# Patient Record
Sex: MALE | Race: ASIAN | NOT HISPANIC OR LATINO | URBAN - METROPOLITAN AREA
[De-identification: names, ages, dates, MRNs, and addresses within clinical notes are randomized per-mention and may not be internally consistent; named-entity substitution may affect disease eponyms.]

---

## 2022-08-17 ENCOUNTER — EMERGENCY (EMERGENCY)
Facility: HOSPITAL | Age: 82
LOS: 1 days | Discharge: HOME | End: 2022-08-17
Attending: EMERGENCY MEDICINE | Admitting: EMERGENCY MEDICINE
Payer: COMMERCIAL

## 2022-08-17 VITALS
RESPIRATION RATE: 18 BRPM | HEART RATE: 88 BPM | TEMPERATURE: 98 F | OXYGEN SATURATION: 98 % | DIASTOLIC BLOOD PRESSURE: 74 MMHG | SYSTOLIC BLOOD PRESSURE: 164 MMHG

## 2022-08-17 VITALS
OXYGEN SATURATION: 99 % | WEIGHT: 199.08 LBS | DIASTOLIC BLOOD PRESSURE: 99 MMHG | HEIGHT: 66 IN | SYSTOLIC BLOOD PRESSURE: 179 MMHG | RESPIRATION RATE: 19 BRPM | HEART RATE: 98 BPM | TEMPERATURE: 98 F

## 2022-08-17 LAB
APPEARANCE UR: CLEAR — SIGNIFICANT CHANGE UP
BILIRUB UR-MCNC: NEGATIVE — SIGNIFICANT CHANGE UP
COLOR SPEC: SIGNIFICANT CHANGE UP
DIFF PNL FLD: ABNORMAL
GLUCOSE UR QL: NEGATIVE — SIGNIFICANT CHANGE UP
KETONES UR-MCNC: NEGATIVE — SIGNIFICANT CHANGE UP
LEUKOCYTE ESTERASE UR-ACNC: NEGATIVE — SIGNIFICANT CHANGE UP
NITRITE UR-MCNC: NEGATIVE — SIGNIFICANT CHANGE UP
PH UR: 6 — SIGNIFICANT CHANGE UP (ref 5–8)
PROT UR-MCNC: NEGATIVE — SIGNIFICANT CHANGE UP
SP GR SPEC: 1.01 — SIGNIFICANT CHANGE UP (ref 1.01–1.02)
UROBILINOGEN FLD QL: NEGATIVE — SIGNIFICANT CHANGE UP

## 2022-08-17 PROCEDURE — 81001 URINALYSIS AUTO W/SCOPE: CPT

## 2022-08-17 PROCEDURE — 99283 EMERGENCY DEPT VISIT LOW MDM: CPT | Mod: 25

## 2022-08-17 PROCEDURE — 99284 EMERGENCY DEPT VISIT MOD MDM: CPT

## 2022-08-17 PROCEDURE — 51702 INSERT TEMP BLADDER CATH: CPT

## 2022-08-17 PROCEDURE — 87086 URINE CULTURE/COLONY COUNT: CPT

## 2022-08-17 NOTE — ED PROVIDER NOTE - NSFOLLOWUPINSTRUCTIONS_ED_ALL_ED_FT
-- Follow up with urology referral to have wheeler catheter removed.    -- Return to the ER for worsening or persistent symptoms, and/or ANY NEW OR CONCERNING SYMPTOMS.    -- If you have difficulty following up, please call: [phone] to obtain a [facility] doctor or specialist who takes your insurance in your area.      Urinary Retention in Men    WHAT YOU NEED TO KNOW:    Urinary retention is a condition that develops when your bladder does not empty completely when you urinate.   Male Reproductive System         DISCHARGE INSTRUCTIONS:    Medicines:   •Medicines can help decrease the size of your prostate, fight infection, and help you urinate more easily.      •Take your medicine as directed. Contact your healthcare provider if you think your medicine is not helping or if you have side effects. Tell him or her if you are allergic to any medicine. Keep a list of the medicines, vitamins, and herbs you take. Include the amounts, and when and why you take them. Bring the list or the pill bottles to follow-up visits. Carry your medicine list with you in case of an emergency.      Wheeler catheter care: You may need a Wheeler catheter for up to 2 weeks at home. Healthcare providers will give you a smaller leg bag to collect urine. Keep the bag below your waist. This will prevent urine from flowing back into your bladder and causing an infection or other problems. Also, keep the tube free of kinks so the urine will drain properly. Do not pull on the catheter. This can cause pain and bleeding, and may cause the catheter to come out. Ask your healthcare provider or urologist for more information on Wheeler catheter care.    Urinate regularly: When your catheter is removed, do not let your bladder become too full before you urinate. Set regular times each day to urinate. Urinate as soon as you feel the need or at least every 3 hours while you are awake. Do not drink liquids before you go to bed. Urinate right before you go to bed.    Follow up with your healthcare provider or urologist as directed: Write down your questions so you remember to ask them during your visits.     Contact your healthcare provider or urologist if:   •You have a fever.      •You have pain when you urinate.      •You have blood in your urine.      •You have problems with your catheter.      •You have questions or concerns about your condition or care.      Return to the emergency department if:   •You have severe abdominal pain.      •You are breathing faster than usual.      •Your heartbeat is faster than usual.      •Your face, hands, feet, or ankles are swollen.

## 2022-08-17 NOTE — ED ADULT NURSE NOTE - OBJECTIVE STATEMENT
Patient is a 81yo male complaining of urinary retention Patient states the last time he urinated was last night. Patient had 624ml in bladder (bladder scanner). Patient denies BPH or urinary difficultly in the past. Patient has pest medical history of HTN and water retention.

## 2022-08-17 NOTE — ED PROVIDER NOTE - CLINICAL SUMMARY MEDICAL DECISION MAKING FREE TEXT BOX
Patient found to be in urinary retention with bladder scan showing over 600 cc of urine.  Ojeda placed draining clear urine.  Will DC with leg bag and follow-up with urology.

## 2022-08-17 NOTE — ED ADULT NURSE NOTE - NSIMPLEMENTINTERV_GEN_ALL_ED
Implemented All Universal Safety Interventions:  Benoit to call system. Call bell, personal items and telephone within reach. Instruct patient to call for assistance. Room bathroom lighting operational. Non-slip footwear when patient is off stretcher. Physically safe environment: no spills, clutter or unnecessary equipment. Stretcher in lowest position, wheels locked, appropriate side rails in place.

## 2022-08-17 NOTE — ED PROVIDER NOTE - PHYSICAL EXAMINATION
Gen: alert, NAD  HEENT:  NC/AT, PERR, no exophthalmos  CV:  well perfused, rrr   Pulm:  normal RR, I/E ratio and chest excursion  Abd: s/nt/nd  MSK: moving all extremities  Neuro:  non-focal  Skin:  visualized areas intact  Psych: AOx3

## 2022-08-17 NOTE — ED PROVIDER NOTE - PATIENT PORTAL LINK FT
You can access the FollowMyHealth Patient Portal offered by [facility] by registering at the following website: [email or web address]. By joining [employer]’s FollowMyHealth portal, you will also be able to view your health information using other applications (apps) compatible with our system.

## 2022-08-17 NOTE — ED PROVIDER NOTE - OBJECTIVE STATEMENT
Patient states that he was not able to urinate this morning.  Last time he urinated was last night before going to bed.  Patient states he attempted to urinate has urge to urinate but he cannot start a stream.  Patient does not denies any recent dysuria.  Denies any history of prior urinary obstruction.

## 2022-08-18 LAB
CULTURE RESULTS: NO GROWTH — SIGNIFICANT CHANGE UP
SPECIMEN SOURCE: SIGNIFICANT CHANGE UP

## 2022-08-21 ENCOUNTER — EMERGENCY (EMERGENCY)
Facility: HOSPITAL | Age: 82
LOS: 1 days | Discharge: HOME | End: 2022-08-21
Attending: EMERGENCY MEDICINE | Admitting: EMERGENCY MEDICINE
Payer: COMMERCIAL

## 2022-08-21 VITALS
HEART RATE: 86 BPM | SYSTOLIC BLOOD PRESSURE: 140 MMHG | DIASTOLIC BLOOD PRESSURE: 80 MMHG | WEIGHT: 199.08 LBS | OXYGEN SATURATION: 98 % | TEMPERATURE: 98 F | RESPIRATION RATE: 18 BRPM | HEIGHT: 66 IN

## 2022-08-21 PROCEDURE — 87086 URINE CULTURE/COLONY COUNT: CPT

## 2022-08-21 PROCEDURE — 99283 EMERGENCY DEPT VISIT LOW MDM: CPT

## 2022-08-21 PROCEDURE — 99284 EMERGENCY DEPT VISIT MOD MDM: CPT

## 2022-08-21 NOTE — ED PROVIDER NOTE - CLINICAL SUMMARY MEDICAL DECISION MAKING FREE TEXT BOX
Patient is an 82-year-old male who presents to the emergency room with a chief complaint of clogged Ojeda catheter.  Past medical history of hypertension peripheral edema.  Patient was last seen in the emergency room on August 17.  At that time he was not able to urinate in the morning.  Patient was found to be in acute urinary retention bladder scan showing over 600 cc of urine.  Ojeda was placed UA urine culture was performed urine culture is reviewed at this time with no growth.  Patient clinically improved and was discharged home.  Patient reports that he was doing well and the Ojeda had been draining fine without issues until around 2 PM today when he noticed decreased urine output.  He then began to develop some suprapubic pressure and came to the emergency room for further work-up.  Attempted to flush Ojeda at bedside unsuccessfully.  Lying in bed no acute distress normocephalic atraumatic pupils equal round heart is regular rate lungs are clear to auscultation abdomen soft nontender nondistended On exam patient Ojeda noted to be in place no urine output at this time there appears to be a clot noted.  Ojeda was replaced still not draining well manually irrigated at the bedside several times now draining pink-tinged urine patient provided with water.  Will hydrate and monitor send UA urine culture.  Patient confirms that he is on aspirin but denies use of other blood thinners.  Has follow-up with urology on the first

## 2022-08-21 NOTE — ED PROVIDER NOTE - NS ED ATTENDING STATEMENT MOD
This was a shared visit with the ANKUR. I reviewed and verified the documentation and independently performed the documented:

## 2022-08-21 NOTE — ED PROVIDER NOTE - OBJECTIVE STATEMENT
pt is a 83yo male with pmhx of htn presents with urinary catheter issue. pt reports he had catheter placed on 8/17 due to urinary retention. at this time pt had urine cx which was negative for infection. pt reports today catheter was not draining well so he came to ed for eval. pt has urologist apt 9/1/22. pt denies fever.

## 2022-08-21 NOTE — ED PROVIDER NOTE - CARE PROVIDER_API CALL
Davonte Siegel)  Urology  [address], [address]  [place] [address]  Phone: [phone]  Fax: [phone]  Follow Up Time: 4-6 Days

## 2022-08-21 NOTE — ED PROVIDER NOTE - NSFOLLOWUPINSTRUCTIONS_ED_ALL_ED_FT
[facility]                                                                         Indwelling Urinary Catheter Care, Adult      An indwelling urinary catheter is a thin tube that is put into your bladder. The tube helps to drain pee (urine) out of your body. The tube goes in through your urethra. Your urethra is where pee comes out of your body. Your pee will come out through the catheter, then it will go into a bag (drainage bag).    Take good care of your catheter so it will work well.      How to wear your catheter and bag    Supplies needed     •Sticky tape (adhesive tape) or a leg strap.      •Alcohol wipe or soap and water (if you use tape).      •A clean towel (if you use tape).      •Large overnight bag.      •Smaller bag (leg bag).      Wearing your catheter     Attach your catheter to your leg with tape or a leg strap.  •Make sure the catheter is not pulled tight.      •If a leg strap gets wet, take it off and put on a dry strap.    •If you use tape to hold the bag on your le.Use an alcohol wipe or soap and water to wash your skin where the tape made it sticky before.      2.Use a clean towel to pat-dry that skin.      3.Use new tape to make the bag stay on your leg.        Wearing your bags    You should have been given a large overnight bag.  •You may wear the overnight bag in the day or night.      •Always have the overnight bag lower than your bladder.  Do not let the bag touch the floor.      •Before you go to sleep, put a clean plastic bag in a wastebasket. Then hang the overnight bag inside the wastebasket.      You should also have a smaller leg bag that fits under your clothes.  •Always wear the leg bag below your knee.      •Do not wear your leg bag at night.        How to care for your skin and catheter    Supplies needed     •A clean washcloth.      •Water and mild soap.      •A clean towel.        Caring for your skin and catheter       Female anatomy showing the bladder, labia, and urethra and an indwelling urinary catheter in the bladder.       Male anatomy showing the bladder, penis, and urethra and an indwelling urinary catheter in the bladder.   •Clean the skin around your catheter every day:  1.Wash your hands with soap and water.      2.Wet a clean washcloth in warm water and mild soap.    3.Clean the skin around your urethra.•If you are female:  •Gently spread the folds of skin around your vagina (labia).      •With the washcloth in your other hand, wipe the inner side of your labia on each side. Wipe from front to back.      •If you are male:  •Pull back any skin that covers the end of your penis (foreskin).      •With the washcloth in your other hand, wipe your penis in small circles. Start wiping at the tip of your penis, then move away from the catheter.      •Move the foreskin back in place, if needed.        4.With your free hand, hold the catheter close to where it goes into your body.  •Keep holding the catheter during cleaning so it does not get pulled out.      5.With the washcloth in your other hand, clean the catheter.  •Only wipe downward on the catheter, toward the drainage bag.      •Do not wipe upward toward your body. Doing this may push germs into your urethra and cause infection.        6.Use a clean towel to pat-dry the catheter and the skin around it. Make sure to wipe off all soap.      7.Wash your hands with soap and water.        •Shower every day. Do not take baths.      • Do not use cream, ointment, or lotion on the area where the catheter goes into your body, unless your doctor tells you to.      • Do not use powders, sprays, or lotions on your genital area.    •Check your skin around the catheter every day for signs of infection. Check for:  •Redness, swelling, or pain.      •Fluid or blood.      •Warmth.      •Pus or a bad smell.          How to empty the bag    Supplies needed     •Rubbing alcohol.      •Gauze pad or cotton ball.      •Tape or a leg strap.      Emptying the bag     Pour the pee out of your bag when it is ?–½ full, or at least 2–3 times a day. Do this for your overnight bag and your leg bag.  1.Wash your hands with soap and water.      2.Separate (detach) the bag from your leg.      3.Hold the bag over the toilet or a clean pail. Keep the bag lower than your hips and bladder. This is so the pee (urine) does not go back into the tube.      4.Open the pour spout. It is at the bottom of the bag.      5.Empty the pee into the toilet or pail. Do not let the pour spout touch any surface.      6.Put rubbing alcohol on a gauze pad or cotton ball.      7.Use the gauze pad or cotton ball to clean the pour spout.      8.Close the pour spout.      9.Attach the bag to your leg with tape or a leg strap.      10.Wash your hands with soap and water.      Follow instructions for cleaning the drainage bag:  •From the product maker.      •As told by your doctor.        How to change the bag    Changing the bag     Replace your bag when it starts to leak, smell bad, or look dirty.  1.Wash your hands with soap and water.      2.Separate the dirty bag from your leg.      3.Pinch the catheter with your fingers so that pee does not spill out.      4.Separate the catheter tube from the bag tube where these tubes connect (at the connection valve). Do not let the tubes touch any surface.      5.Clean the end of the catheter tube with an alcohol wipe. Use a different alcohol wipe to clean the end of the bag tube.      6.Connect the catheter tube to the tube of the clean bag.      7.Attach the clean bag to your leg with tape or a leg strap. Do not make the bag tight on your leg.      8.Wash your hands with soap and water.        General rules  Washing hands with soap and water.   • Never pull on your catheter. Never try to take it out. Doing that can hurt you.      •Always wash your hands before and after you touch your catheter or bag. Use a mild, fragrance-free soap. If you do not have soap and water, use hand .      •Always make sure there are no twists or bends (kinks) in the catheter tube.      •Always make sure there are no leaks in the catheter or bag.      •Drink enough fluid to keep your pee pale yellow.      • Do not take baths, swim, or use a hot tub.      •If you are female, wipe from front to back after you poop (have a bowel movement).        Contact a doctor if:    •Your pee is cloudy.      •Your pee smells worse than usual.      •Your catheter gets clogged.      •Your catheter leaks.      •Your bladder feels full.        Get help right away if:    •You have redness, swelling, or pain where the catheter goes into your body.      •You have fluid, blood, pus, or a bad smell coming from the area where the catheter goes into your body.      •Your skin feels warm where the catheter goes into your body.      •You have a fever.    •You have pain in your:  •Belly (abdomen).      •Legs.      •Lower back.      •Bladder.        •You see blood in the catheter.      •Your pee is pink or red.      •You feel sick to your stomach (nauseous).      •You throw up (vomit).      •You have chills.      •Your pee is not draining into the bag.      •Your catheter gets pulled out.        Summary    •An indwelling urinary catheter is a thin tube that is placed into the bladder to help drain pee (urine) out of the body.       •The catheter is placed into the part of the body that drains pee from the bladder (urethra).      •Taking good care of your catheter will keep it working properly and help prevent problems.      •Always wash your hands before and after touching your catheter or bag.      • Never pull on your catheter or try to take it out.      This information is not intended to replace advice given to you by your health care provider. Make sure you discuss any questions you have with your health care provider.      Document Revised: 2022 Document Reviewed: 2021    Elsevier Patient Education ©  [employer] 1. FOLLOW UP WITH YOUR PRIMARY DOCTOR IN 24-48 HOURS.   2. FOLLOW UP WITH ALL SPECIALIST DISCUSSED DURING YOUR VISIT.   3. TAKE ALL MEDICATIONS PRESCRIBED IN THE ER IF ANY ARE PRESCRIBED. CONTINUE YOUR HOME MEDICATIONS UNLESS OTHERWISE ADVISED DIFFERENTLY.   4. RETURN FOR WORSENING SYMPTOMS OR CONCERNS INCLUDING BUT NOT LIMITED TO FEVER, CHEST PAIN, OR TROUBLE BREATHING OR ANY OTHER CONCERNS  hydrate with water                                                           Indwelling Urinary Catheter Care, Adult      An indwelling urinary catheter is a thin tube that is put into your bladder. The tube helps to drain pee (urine) out of your body. The tube goes in through your urethra. Your urethra is where pee comes out of your body. Your pee will come out through the catheter, then it will go into a bag (drainage bag).    Take good care of your catheter so it will work well.      How to wear your catheter and bag    Supplies needed     •Sticky tape (adhesive tape) or a leg strap.      •Alcohol wipe or soap and water (if you use tape).      •A clean towel (if you use tape).      •Large overnight bag.      •Smaller bag (leg bag).      Wearing your catheter     Attach your catheter to your leg with tape or a leg strap.  •Make sure the catheter is not pulled tight.      •If a leg strap gets wet, take it off and put on a dry strap.    •If you use tape to hold the bag on your le.Use an alcohol wipe or soap and water to wash your skin where the tape made it sticky before.      2.Use a clean towel to pat-dry that skin.      3.Use new tape to make the bag stay on your leg.        Wearing your bags    You should have been given a large overnight bag.  •You may wear the overnight bag in the day or night.      •Always have the overnight bag lower than your bladder.  Do not let the bag touch the floor.      •Before you go to sleep, put a clean plastic bag in a wastebasket. Then hang the overnight bag inside the wastebasket.      You should also have a smaller leg bag that fits under your clothes.  •Always wear the leg bag below your knee.      •Do not wear your leg bag at night.        How to care for your skin and catheter    Supplies needed     •A clean washcloth.      •Water and mild soap.      •A clean towel.        Caring for your skin and catheter       Female anatomy showing the bladder, labia, and urethra and an indwelling urinary catheter in the bladder.       Male anatomy showing the bladder, penis, and urethra and an indwelling urinary catheter in the bladder.   •Clean the skin around your catheter every day:  1.Wash your hands with soap and water.      2.Wet a clean washcloth in warm water and mild soap.    3.Clean the skin around your urethra.•If you are female:  •Gently spread the folds of skin around your vagina (labia).      •With the washcloth in your other hand, wipe the inner side of your labia on each side. Wipe from front to back.      •If you are male:  •Pull back any skin that covers the end of your penis (foreskin).      •With the washcloth in your other hand, wipe your penis in small circles. Start wiping at the tip of your penis, then move away from the catheter.      •Move the foreskin back in place, if needed.        4.With your free hand, hold the catheter close to where it goes into your body.  •Keep holding the catheter during cleaning so it does not get pulled out.      5.With the washcloth in your other hand, clean the catheter.  •Only wipe downward on the catheter, toward the drainage bag.      •Do not wipe upward toward your body. Doing this may push germs into your urethra and cause infection.        6.Use a clean towel to pat-dry the catheter and the skin around it. Make sure to wipe off all soap.      7.Wash your hands with soap and water.        •Shower every day. Do not take baths.      • Do not use cream, ointment, or lotion on the area where the catheter goes into your body, unless your doctor tells you to.      • Do not use powders, sprays, or lotions on your genital area.    •Check your skin around the catheter every day for signs of infection. Check for:  •Redness, swelling, or pain.      •Fluid or blood.      •Warmth.      •Pus or a bad smell.          How to empty the bag    Supplies needed     •Rubbing alcohol.      •Gauze pad or cotton ball.      •Tape or a leg strap.      Emptying the bag     Pour the pee out of your bag when it is ?–½ full, or at least 2–3 times a day. Do this for your overnight bag and your leg bag.  1.Wash your hands with soap and water.      2.Separate (detach) the bag from your leg.      3.Hold the bag over the toilet or a clean pail. Keep the bag lower than your hips and bladder. This is so the pee (urine) does not go back into the tube.      4.Open the pour spout. It is at the bottom of the bag.      5.Empty the pee into the toilet or pail. Do not let the pour spout touch any surface.      6.Put rubbing alcohol on a gauze pad or cotton ball.      7.Use the gauze pad or cotton ball to clean the pour spout.      8.Close the pour spout.      9.Attach the bag to your leg with tape or a leg strap.      10.Wash your hands with soap and water.      Follow instructions for cleaning the drainage bag:  •From the product maker.      •As told by your doctor.        How to change the bag    Changing the bag     Replace your bag when it starts to leak, smell bad, or look dirty.  1.Wash your hands with soap and water.      2.Separate the dirty bag from your leg.      3.Pinch the catheter with your fingers so that pee does not spill out.      4.Separate the catheter tube from the bag tube where these tubes connect (at the connection valve). Do not let the tubes touch any surface.      5.Clean the end of the catheter tube with an alcohol wipe. Use a different alcohol wipe to clean the end of the bag tube.      6.Connect the catheter tube to the tube of the clean bag.      7.Attach the clean bag to your leg with tape or a leg strap. Do not make the bag tight on your leg.      8.Wash your hands with soap and water.        General rules  Washing hands with soap and water.   • Never pull on your catheter. Never try to take it out. Doing that can hurt you.      •Always wash your hands before and after you touch your catheter or bag. Use a mild, fragrance-free soap. If you do not have soap and water, use hand .      •Always make sure there are no twists or bends (kinks) in the catheter tube.      •Always make sure there are no leaks in the catheter or bag.      •Drink enough fluid to keep your pee pale yellow.      • Do not take baths, swim, or use a hot tub.      •If you are female, wipe from front to back after you poop (have a bowel movement).        Contact a doctor if:    •Your pee is cloudy.      •Your pee smells worse than usual.      •Your catheter gets clogged.      •Your catheter leaks.      •Your bladder feels full.        Get help right away if:    •You have redness, swelling, or pain where the catheter goes into your body.      •You have fluid, blood, pus, or a bad smell coming from the area where the catheter goes into your body.      •Your skin feels warm where the catheter goes into your body.      •You have a fever.    •You have pain in your:  •Belly (abdomen).      •Legs.      •Lower back.      •Bladder.        •You see blood in the catheter.      •Your pee is pink or red.      •You feel sick to your stomach (nauseous).      •You throw up (vomit).      •You have chills.      •Your pee is not draining into the bag.      •Your catheter gets pulled out.        Summary    •An indwelling urinary catheter is a thin tube that is placed into the bladder to help drain pee (urine) out of the body.       •The catheter is placed into the part of the body that drains pee from the bladder (urethra).      •Taking good care of your catheter will keep it working properly and help prevent problems.      •Always wash your hands before and after touching your catheter or bag.      • Never pull on your catheter or try to take it out.      This information is not intended to replace advice given to you by your health care provider. Make sure you discuss any questions you have with your health care provider.      Document Revised: 2022 Document Reviewed: 2021    Elsevier Patient Education ©  [employer]

## 2022-08-21 NOTE — ED ADULT NURSE NOTE - OBJECTIVE STATEMENT
Patient arrives from triage alert and oriented c/o non draining Ojeda. Patient states that Ojeda was placed on the 17th and today at 2pm stopped flowing. Patient denies abdominal pain, leg Ojeda bag has blood tinged jamie urine. Ojeda irrigated to no avail. Ojeda changed with 16F Ojeda and irrigated by Dr. Palomino with some output. Currently monitoring urinary output.

## 2022-08-21 NOTE — ED PROVIDER NOTE - PATIENT PORTAL LINK FT
You can access the FollowMyHealth Patient Portal offered by Health system by registering at the following website: [email or web address]. By joining [employer]’s FollowMyHealth portal, you will also be able to view your health information using other applications (apps) compatible with our system.

## 2022-08-21 NOTE — ED PROVIDER NOTE - PROGRESS NOTE DETAILS
wheeler changed pt improved. draining clear urine advised hydration and urologist follow up. . All questions answered and concerns addressed. pt verbalized understanding and agreement with plan and dx. pt advised on next step and when/where to follow up. pt advised on all take home and otc medications. pt advised to follow up with PMD. pt advised to return to ed for worsenng symptoms including fever, cp, sob. will dc.

## 2022-08-21 NOTE — ED ADULT NURSE NOTE - NSIMPLEMENTINTERV_GEN_ALL_ED
Implemented All Fall with Harm Risk Interventions:  [place] to call system. Call bell, personal items and telephone within reach. Instruct patient to call for assistance. Room bathroom lighting operational. Non-slip footwear when patient is off stretcher. Physically safe environment: no spills, clutter or unnecessary equipment. Stretcher in lowest position, wheels locked, appropriate side rails in place. Provide visual cue, wrist band, yellow gown, etc. Monitor gait and stability. Monitor for mental status changes and reorient to person, place, and time. Review medications for side effects contributing to fall risk. Reinforce activity limits and safety measures with patient and family. Provide visual clues: red socks.

## 2022-08-22 PROBLEM — I10 ESSENTIAL (PRIMARY) HYPERTENSION: Chronic | Status: ACTIVE | Noted: 2022-08-17

## 2022-08-22 LAB
CULTURE RESULTS: NO GROWTH — SIGNIFICANT CHANGE UP
SPECIMEN SOURCE: SIGNIFICANT CHANGE UP

## 2022-08-30 ENCOUNTER — EMERGENCY (EMERGENCY)
Facility: HOSPITAL | Age: 82
LOS: 1 days | Discharge: HOME | End: 2022-08-30
Attending: EMERGENCY MEDICINE | Admitting: EMERGENCY MEDICINE
Payer: COMMERCIAL

## 2022-08-30 VITALS
OXYGEN SATURATION: 92 % | TEMPERATURE: 101 F | WEIGHT: 199.08 LBS | HEART RATE: 105 BPM | HEIGHT: 66 IN | DIASTOLIC BLOOD PRESSURE: 71 MMHG | RESPIRATION RATE: 18 BRPM | SYSTOLIC BLOOD PRESSURE: 131 MMHG

## 2022-08-30 VITALS
SYSTOLIC BLOOD PRESSURE: 133 MMHG | DIASTOLIC BLOOD PRESSURE: 75 MMHG | RESPIRATION RATE: 16 BRPM | TEMPERATURE: 98 F | OXYGEN SATURATION: 95 % | HEART RATE: 88 BPM

## 2022-08-30 LAB
ALBUMIN SERPL ELPH-MCNC: 3.3 G/DL — SIGNIFICANT CHANGE UP (ref 3.3–5)
ALP SERPL-CCNC: 84 U/L — SIGNIFICANT CHANGE UP (ref 40–120)
ALT FLD-CCNC: 26 U/L — SIGNIFICANT CHANGE UP (ref 12–78)
ANION GAP SERPL CALC-SCNC: 7 MMOL/L — SIGNIFICANT CHANGE UP (ref 5–17)
APPEARANCE UR: ABNORMAL
APTT BLD: 31.1 SEC — SIGNIFICANT CHANGE UP (ref 27.5–35.5)
AST SERPL-CCNC: 30 U/L — SIGNIFICANT CHANGE UP (ref 15–37)
BACTERIA # UR AUTO: ABNORMAL
BASOPHILS # BLD AUTO: 0.02 K/UL — SIGNIFICANT CHANGE UP (ref 0–0.2)
BASOPHILS NFR BLD AUTO: 0.3 % — SIGNIFICANT CHANGE UP (ref 0–2)
BILIRUB SERPL-MCNC: 1.5 MG/DL — HIGH (ref 0.2–1.2)
BILIRUB UR-MCNC: NEGATIVE — SIGNIFICANT CHANGE UP
BUN SERPL-MCNC: 8 MG/DL — SIGNIFICANT CHANGE UP (ref 7–23)
CALCIUM SERPL-MCNC: 8.7 MG/DL — SIGNIFICANT CHANGE UP (ref 8.5–10.1)
CHLORIDE SERPL-SCNC: 96 MMOL/L — SIGNIFICANT CHANGE UP (ref 96–108)
CO2 SERPL-SCNC: 27 MMOL/L — SIGNIFICANT CHANGE UP (ref 22–31)
COLOR SPEC: YELLOW — SIGNIFICANT CHANGE UP
CREAT SERPL-MCNC: 0.92 MG/DL — SIGNIFICANT CHANGE UP (ref 0.5–1.3)
DIFF PNL FLD: ABNORMAL
EGFR: 84 ML/MIN/1.73M2 — SIGNIFICANT CHANGE UP
EOSINOPHIL # BLD AUTO: 0.04 K/UL — SIGNIFICANT CHANGE UP (ref 0–0.5)
EOSINOPHIL NFR BLD AUTO: 0.6 % — SIGNIFICANT CHANGE UP (ref 0–6)
EPI CELLS # UR: SIGNIFICANT CHANGE UP
GLUCOSE SERPL-MCNC: 109 MG/DL — HIGH (ref 70–99)
GLUCOSE UR QL: NEGATIVE — SIGNIFICANT CHANGE UP
HCT VFR BLD CALC: 37.6 % — LOW (ref 39–50)
HGB BLD-MCNC: 12.6 G/DL — LOW (ref 13–17)
IMM GRANULOCYTES NFR BLD AUTO: 1.3 % — SIGNIFICANT CHANGE UP (ref 0–1.5)
INR BLD: 1.3 RATIO — HIGH (ref 0.88–1.16)
KETONES UR-MCNC: NEGATIVE — SIGNIFICANT CHANGE UP
LACTATE SERPL-SCNC: 1.9 MMOL/L — SIGNIFICANT CHANGE UP (ref 0.7–2)
LEUKOCYTE ESTERASE UR-ACNC: ABNORMAL
LYMPHOCYTES # BLD AUTO: 0.47 K/UL — LOW (ref 1–3.3)
LYMPHOCYTES # BLD AUTO: 6.7 % — LOW (ref 13–44)
MANUAL SMEAR VERIFICATION: YES — SIGNIFICANT CHANGE UP
MCHC RBC-ENTMCNC: 29.4 PG — SIGNIFICANT CHANGE UP (ref 27–34)
MCHC RBC-ENTMCNC: 33.5 GM/DL — SIGNIFICANT CHANGE UP (ref 32–36)
MCV RBC AUTO: 87.9 FL — SIGNIFICANT CHANGE UP (ref 80–100)
MONOCYTES # BLD AUTO: 0.03 K/UL — SIGNIFICANT CHANGE UP (ref 0–0.9)
MONOCYTES NFR BLD AUTO: 0.4 % — LOW (ref 2–14)
NEUTROPHILS # BLD AUTO: 6.32 K/UL — SIGNIFICANT CHANGE UP (ref 1.8–7.4)
NEUTROPHILS NFR BLD AUTO: 90.7 % — HIGH (ref 43–77)
NITRITE UR-MCNC: NEGATIVE — SIGNIFICANT CHANGE UP
NRBC # BLD: 0 /100 WBCS — SIGNIFICANT CHANGE UP (ref 0–0)
PH UR: 5 — SIGNIFICANT CHANGE UP (ref 5–8)
PLAT MORPH BLD: NORMAL — SIGNIFICANT CHANGE UP
PLATELET # BLD AUTO: 191 K/UL — SIGNIFICANT CHANGE UP (ref 150–400)
POTASSIUM SERPL-MCNC: 4.3 MMOL/L — SIGNIFICANT CHANGE UP (ref 3.5–5.3)
POTASSIUM SERPL-SCNC: 4.3 MMOL/L — SIGNIFICANT CHANGE UP (ref 3.5–5.3)
PROT SERPL-MCNC: 7.3 G/DL — SIGNIFICANT CHANGE UP (ref 6–8.3)
PROT UR-MCNC: 15
PROTHROM AB SERPL-ACNC: 15.2 SEC — HIGH (ref 10.5–13.4)
RAPID RVP RESULT: SIGNIFICANT CHANGE UP
RBC # BLD: 4.28 M/UL — SIGNIFICANT CHANGE UP (ref 4.2–5.8)
RBC # FLD: 14.4 % — SIGNIFICANT CHANGE UP (ref 10.3–14.5)
RBC BLD AUTO: SIGNIFICANT CHANGE UP
RBC CASTS # UR COMP ASSIST: ABNORMAL /HPF (ref 0–4)
SARS-COV-2 RNA SPEC QL NAA+PROBE: SIGNIFICANT CHANGE UP
SODIUM SERPL-SCNC: 130 MMOL/L — LOW (ref 135–145)
SP GR SPEC: 1 — LOW (ref 1.01–1.02)
UROBILINOGEN FLD QL: NEGATIVE — SIGNIFICANT CHANGE UP
WBC # BLD: 6.97 K/UL — SIGNIFICANT CHANGE UP (ref 3.8–10.5)
WBC # FLD AUTO: 6.97 K/UL — SIGNIFICANT CHANGE UP (ref 3.8–10.5)
WBC UR QL: >50

## 2022-08-30 PROCEDURE — 85730 THROMBOPLASTIN TIME PARTIAL: CPT

## 2022-08-30 PROCEDURE — 71045 X-RAY EXAM CHEST 1 VIEW: CPT

## 2022-08-30 PROCEDURE — 99285 EMERGENCY DEPT VISIT HI MDM: CPT | Mod: 25

## 2022-08-30 PROCEDURE — 0225U NFCT DS DNA&RNA 21 SARSCOV2: CPT

## 2022-08-30 PROCEDURE — 36415 COLL VENOUS BLD VENIPUNCTURE: CPT

## 2022-08-30 PROCEDURE — 83605 ASSAY OF LACTIC ACID: CPT

## 2022-08-30 PROCEDURE — 99285 EMERGENCY DEPT VISIT HI MDM: CPT

## 2022-08-30 PROCEDURE — 87077 CULTURE AEROBIC IDENTIFY: CPT

## 2022-08-30 PROCEDURE — 87086 URINE CULTURE/COLONY COUNT: CPT

## 2022-08-30 PROCEDURE — 93010 ELECTROCARDIOGRAM REPORT: CPT

## 2022-08-30 PROCEDURE — 80053 COMPREHEN METABOLIC PANEL: CPT

## 2022-08-30 PROCEDURE — 87040 BLOOD CULTURE FOR BACTERIA: CPT

## 2022-08-30 PROCEDURE — 85610 PROTHROMBIN TIME: CPT

## 2022-08-30 PROCEDURE — 85025 COMPLETE CBC W/AUTO DIFF WBC: CPT

## 2022-08-30 PROCEDURE — 71045 X-RAY EXAM CHEST 1 VIEW: CPT | Mod: 26

## 2022-08-30 PROCEDURE — 81001 URINALYSIS AUTO W/SCOPE: CPT

## 2022-08-30 PROCEDURE — 87186 SC STD MICRODIL/AGAR DIL: CPT

## 2022-08-30 PROCEDURE — 96374 THER/PROPH/DIAG INJ IV PUSH: CPT

## 2022-08-30 PROCEDURE — 93005 ELECTROCARDIOGRAM TRACING: CPT

## 2022-08-30 RX ORDER — SODIUM CHLORIDE 9 MG/ML
1000 INJECTION INTRAMUSCULAR; INTRAVENOUS; SUBCUTANEOUS ONCE
Refills: 0 | Status: COMPLETED | OUTPATIENT
Start: 2022-08-30 | End: 2022-08-30

## 2022-08-30 RX ORDER — IBUPROFEN 200 MG
600 TABLET ORAL ONCE
Refills: 0 | Status: COMPLETED | OUTPATIENT
Start: 2022-08-30 | End: 2022-08-30

## 2022-08-30 RX ORDER — CEFUROXIME AXETIL 250 MG
1 TABLET ORAL
Qty: 14 | Refills: 0
Start: 2022-08-30 | End: 2022-09-05

## 2022-08-30 RX ORDER — CEFTRIAXONE 500 MG/1
1000 INJECTION, POWDER, FOR SOLUTION INTRAMUSCULAR; INTRAVENOUS ONCE
Refills: 0 | Status: COMPLETED | OUTPATIENT
Start: 2022-08-30 | End: 2022-08-30

## 2022-08-30 RX ORDER — ACETAMINOPHEN 500 MG
650 TABLET ORAL ONCE
Refills: 0 | Status: COMPLETED | OUTPATIENT
Start: 2022-08-30 | End: 2022-08-30

## 2022-08-30 RX ADMIN — CEFTRIAXONE 100 MILLIGRAM(S): 500 INJECTION, POWDER, FOR SOLUTION INTRAMUSCULAR; INTRAVENOUS at 03:28

## 2022-08-30 RX ADMIN — Medication 650 MILLIGRAM(S): at 02:16

## 2022-08-30 RX ADMIN — SODIUM CHLORIDE 1000 MILLILITER(S): 9 INJECTION INTRAMUSCULAR; INTRAVENOUS; SUBCUTANEOUS at 02:16

## 2022-08-30 RX ADMIN — Medication 600 MILLIGRAM(S): at 03:42

## 2022-08-30 NOTE — ED ADULT NURSE NOTE - OBJECTIVE STATEMENT
pt a/o x 4 with a calm affect c/o chills, fever after having wheeler catheter removed yesterday.  patient states he is able to urinate on own.

## 2022-08-30 NOTE — ED PROVIDER NOTE - PROGRESS NOTE DETAILS
pt has more than 500cc of urine in his bladder, will place wheeler, d/c home with abx, have pt follow up with his urologist, return if any symptoms worsen

## 2022-08-30 NOTE — ED ADULT NURSE NOTE - NS ED NURSE DC INFO COMPLEXITY
PCP: Arlin Bragg MD   Medication verified, no changes  Denies known Latex allergy or symptoms of Latex sensitivity  Tobacco history verified.  Okay to release results through [employer]:  yes  Patient would like communication of their results via:     Mobile [phone]       Verbal permission granted by patient to leave a detailed message with medical information on answering machine at phone number given? yes    If female, are you pregnant, trying to become pregnant, or breastfeeding? No    Pacemaker: no  Defibrillator: no  Taking blood thinners: no  Allergy to lidocaine: no    Nurses notes reviewed and accepted.    CHIEF COMPLAINT:  Derm Problem        HISTORY OF PRESENT ILLNESS:    Martha Sanchez is a 31 year old female presenting for follow-up of shave biopsy on the right lower back and left shin.    Location:  back and lower extremities  Symptoms:  itching  Current treatments: none   Problems with treatment: none   Condition is getting worse.               Simple: Patient demonstrates quick and easy understanding/Verbalized Understanding

## 2022-08-30 NOTE — ED PROVIDER NOTE - OBJECTIVE STATEMENT
81yo male who presents with chills all day and sob, pt had his catheter removed today at the urologist office and has been having chills all day, no fever, no vomiting or diarrhea, no cough no other complaints

## 2022-08-30 NOTE — ED PROVIDER NOTE - NSFOLLOWUPINSTRUCTIONS_ED_ALL_ED_FT
[facility]                                                                                                                                                                   Urinary Tract Infection, Adult       A urinary tract infection (UTI) is an infection of any part of the urinary tract. The urinary tract includes the kidneys, ureters, bladder, and urethra. These organs make, store, and get rid of urine in the body.    An upper UTI affects the ureters and kidneys. A lower UTI affects the bladder and urethra.      What are the causes?    Most urinary tract infections are caused by bacteria in your genital area around your urethra, where urine leaves your body. These bacteria grow and cause inflammation of your urinary tract.      What increases the risk?    You are more likely to develop this condition if:  •You have a urinary catheter that stays in place.      •You are not able to control when you urinate or have a bowel movement (incontinence).    •You are female and you:  •Use a spermicide or diaphragm for birth control.      •Have low estrogen levels.      •Are pregnant.        •You have certain genes that increase your risk.      •You are sexually active.      •You take antibiotic medicines.    •You have a condition that causes your flow of urine to slow down, such as:  •An enlarged prostate, if you are male.      •Blockage in your urethra.      •A kidney stone.      •A nerve condition that affects your bladder control (neurogenic bladder).      •Not getting enough to drink, or not urinating often.      •You have certain medical conditions, such as:  •Diabetes.      •A weak disease-fighting system (immunesystem).      •Sickle cell disease.      •Gout.      •Spinal cord injury.          What are the signs or symptoms?    Symptoms of this condition include:  •Needing to urinate right away (urgency).      •Frequent urination. This may include small amounts of urine each time you urinate.      •Pain or burning with urination.      •Blood in the urine.      •Urine that smells bad or unusual.      •Trouble urinating.      •Cloudy urine.      •Vaginal discharge, if you are female.      •Pain in the abdomen or the lower back.      You may also have:  •Vomiting or a decreased appetite.      •Confusion.      •Irritability or tiredness.      •A fever or chills.      •Diarrhea.      The first symptom in older adults may be confusion. In some cases, they may not have any symptoms until the infection has worsened.      How is this diagnosed?    This condition is diagnosed based on your medical history and a physical exam. You may also have other tests, including:  •Urine tests.      •Blood tests.      •Tests for STIs (sexually transmitted infections).      If you have had more than one UTI, a cystoscopy or imaging studies may be done to determine the cause of the infections.      How is this treated?    Treatment for this condition includes:  •Antibiotic medicine.      •Over-the-counter medicines to treat discomfort.      •Drinking enough water to stay hydrated.      If you have frequent infections or have other conditions such as a kidney stone, you may need to see a health care provider who specializes in the urinary tract (urologist).    In rare cases, urinary tract infections can cause sepsis. Sepsis is a life-threatening condition that occurs when the body responds to an infection. Sepsis is treated in the hospital with IV antibiotics, fluids, and other medicines.      Follow these instructions at home:     Medicines     •Take over-the-counter and prescription medicines only as told by your health care provider.      •If you were prescribed an antibiotic medicine, take it as told by your health care provider. Do not stop using the antibiotic even if you start to feel better.      General instructions   •Make sure you:  •Empty your bladder often and completely. Do not hold urine for long periods of time.      •Empty your bladder after sex.      •Wipe from front to back after urinating or having a bowel movement if you are female. Use each tissue only one time when you wipe.        •Drink enough fluid to keep your urine pale yellow.      •Keep all follow-up visits. This is important.        Contact a health care provider if:    •Your symptoms do not get better after 1–2 days.      •Your symptoms go away and then return.        Get help right away if:    •You have severe pain in your back or your lower abdomen.      •You have a fever or chills.      •You have nausea or vomiting.        Summary    •A urinary tract infection (UTI) is an infection of any part of the urinary tract, which includes the kidneys, ureters, bladder, and urethra.      •Most urinary tract infections are caused by bacteria in your genital area.      •Treatment for this condition often includes antibiotic medicines.      •If you were prescribed an antibiotic medicine, take it as told by your health care provider. Do not stop using the antibiotic even if you start to feel better.      •Keep all follow-up visits. This is important.      This information is not intended to replace advice given to you by your health care provider. Make sure you discuss any questions you have with your health care provider.      Document Revised: 07/30/2021 Document Reviewed: 07/30/2021    Elsevier Patient Education © 2022 Elsevier Inc.

## 2022-08-30 NOTE — ED PROVIDER NOTE - PATIENT PORTAL LINK FT
You can access the FollowMyHealth Patient Portal offered by [facility] by registering at the following website: [email or web address]. By joining [employer]’s FollowMyHealth portal, you will also be able to view your health information using other applications (apps) compatible with our system.

## 2022-08-30 NOTE — ED PROVIDER NOTE - NSCAREINITIATED _GEN_ER
REFUSED WOUND CARE

PATIENT REFUSED TO ALLOW THIS NURSE OR EDP TO CLEAN WOUND, PUT NEOSPORIN AND 
DRESSING ON WOUND. EXPLAINED THE NEED TO CLEAN THE OPEN WOUND, APPLY 
NEOSPORIN,AND DRESS WOUND. PATIENT REFUSED AND MOTHER IN AGREEANCE WITH 
PATIENT. Viktoriya Paredes(Attending)

## 2022-09-04 LAB
CULTURE RESULTS: SIGNIFICANT CHANGE UP
CULTURE RESULTS: SIGNIFICANT CHANGE UP
SPECIMEN SOURCE: SIGNIFICANT CHANGE UP
SPECIMEN SOURCE: SIGNIFICANT CHANGE UP

## 2022-09-19 NOTE — ASU PATIENT PROFILE, ADULT - FALL HARM RISK - UNIVERSAL INTERVENTIONS
Bed in lowest position, wheels locked, appropriate side rails in place/Call bell, personal items and telephone in reach/Instruct patient to call for assistance before getting out of bed or chair/Non-slip footwear when patient is out of bed/[place] to call system/Physically safe environment - no spills, clutter or unnecessary equipment/Purposeful Proactive Rounding/Room/bathroom lighting operational, light cord in reach

## 2022-09-19 NOTE — ASU PATIENT PROFILE, ADULT - NSICDXPASTMEDICALHX_GEN_ALL_CORE_FT
PAST MEDICAL HISTORY:  H/O arthritis knee    History of BPH     HTN (hypertension)     Hyperuricemia

## 2022-09-20 ENCOUNTER — OUTPATIENT (OUTPATIENT)
Dept: INPATIENT UNIT | Facility: HOSPITAL | Age: 82
LOS: 1 days | Discharge: HOME | End: 2022-09-20
Payer: MEDICARE

## 2022-09-20 VITALS
HEIGHT: 64 IN | DIASTOLIC BLOOD PRESSURE: 85 MMHG | OXYGEN SATURATION: 100 % | RESPIRATION RATE: 14 BRPM | WEIGHT: 190.92 LBS | SYSTOLIC BLOOD PRESSURE: 156 MMHG | TEMPERATURE: 97 F | HEART RATE: 74 BPM

## 2022-09-20 DIAGNOSIS — R33.9 RETENTION OF URINE, UNSPECIFIED: ICD-10-CM

## 2022-09-20 DIAGNOSIS — Z98.890 OTHER SPECIFIED POSTPROCEDURAL STATES: Chronic | ICD-10-CM

## 2022-09-20 PROCEDURE — 86850 RBC ANTIBODY SCREEN: CPT

## 2022-09-20 PROCEDURE — 88307 TISSUE EXAM BY PATHOLOGIST: CPT | Mod: 26

## 2022-09-20 PROCEDURE — 88307 TISSUE EXAM BY PATHOLOGIST: CPT

## 2022-09-20 PROCEDURE — 36415 COLL VENOUS BLD VENIPUNCTURE: CPT

## 2022-09-20 PROCEDURE — 86901 BLOOD TYPING SEROLOGIC RH(D): CPT

## 2022-09-20 PROCEDURE — 86900 BLOOD TYPING SEROLOGIC ABO: CPT

## 2022-09-20 RX ORDER — FUROSEMIDE 40 MG
20 TABLET ORAL DAILY
Refills: 0 | Status: DISCONTINUED | OUTPATIENT
Start: 2022-09-20 | End: 2022-09-21

## 2022-09-20 RX ORDER — METOPROLOL TARTRATE 50 MG
25 TABLET ORAL DAILY
Refills: 0 | Status: DISCONTINUED | OUTPATIENT
Start: 2022-09-20 | End: 2022-09-21

## 2022-09-20 RX ORDER — ASPIRIN/CALCIUM CARB/MAGNESIUM 324 MG
81 TABLET ORAL DAILY
Refills: 0 | Status: DISCONTINUED | OUTPATIENT
Start: 2022-09-20 | End: 2022-09-21

## 2022-09-20 RX ORDER — METOPROLOL TARTRATE 50 MG
1 TABLET ORAL
Qty: 0 | Refills: 0 | DISCHARGE

## 2022-09-20 RX ORDER — TAMSULOSIN HYDROCHLORIDE 0.4 MG/1
0.4 CAPSULE ORAL AT BEDTIME
Refills: 0 | Status: DISCONTINUED | OUTPATIENT
Start: 2022-09-20 | End: 2022-09-21

## 2022-09-20 RX ORDER — SODIUM CHLORIDE 9 MG/ML
1000 INJECTION, SOLUTION INTRAVENOUS
Refills: 0 | Status: DISCONTINUED | OUTPATIENT
Start: 2022-09-20 | End: 2022-09-20

## 2022-09-20 RX ORDER — FINASTERIDE 5 MG/1
5 TABLET, FILM COATED ORAL DAILY
Refills: 0 | Status: DISCONTINUED | OUTPATIENT
Start: 2022-09-20 | End: 2022-09-21

## 2022-09-20 RX ORDER — ONDANSETRON 8 MG/1
4 TABLET, FILM COATED ORAL ONCE
Refills: 0 | Status: DISCONTINUED | OUTPATIENT
Start: 2022-09-20 | End: 2022-09-20

## 2022-09-20 RX ORDER — FENTANYL CITRATE 50 UG/ML
50 INJECTION INTRAVENOUS
Refills: 0 | Status: DISCONTINUED | OUTPATIENT
Start: 2022-09-20 | End: 2022-09-20

## 2022-09-20 RX ORDER — FUROSEMIDE 40 MG
1 TABLET ORAL
Qty: 0 | Refills: 0 | DISCHARGE

## 2022-09-20 RX ORDER — OXYCODONE HYDROCHLORIDE 5 MG/1
5 TABLET ORAL ONCE
Refills: 0 | Status: DISCONTINUED | OUTPATIENT
Start: 2022-09-20 | End: 2022-09-20

## 2022-09-20 RX ADMIN — TAMSULOSIN HYDROCHLORIDE 0.4 MILLIGRAM(S): 0.4 CAPSULE ORAL at 22:09

## 2022-09-20 RX ADMIN — SODIUM CHLORIDE 100 MILLILITER(S): 9 INJECTION, SOLUTION INTRAVENOUS at 18:36

## 2022-09-20 RX ADMIN — OXYCODONE HYDROCHLORIDE 5 MILLIGRAM(S): 5 TABLET ORAL at 18:45

## 2022-09-20 RX ADMIN — OXYCODONE HYDROCHLORIDE 5 MILLIGRAM(S): 5 TABLET ORAL at 18:33

## 2022-09-20 NOTE — ASU DISCHARGE PLAN (ADULT/PEDIATRIC) - NURSING INSTRUCTIONS
cbi with NS and irrigate prn  Upon discharge change wheeler bag to leg bag and clamp in port of 3 way wheeler

## 2022-09-20 NOTE — ASU DISCHARGE PLAN (ADULT/PEDIATRIC) - NS MD DC FALL RISK RISK
For information on Fall & Injury Prevention, visit: [email or web address] OR  [email or web address] OR  https://www.cdc.gov/steadi/patient.html

## 2022-09-20 NOTE — BRIEF OPERATIVE NOTE - NSICDXBRIEFPOSTOP_GEN_ALL_CORE_FT
POST-OP DIAGNOSIS:  Hyperplasia of prostate with urinary retention 20-Sep-2022 17:42:28  Davis Ralph

## 2022-09-21 VITALS
SYSTOLIC BLOOD PRESSURE: 124 MMHG | TEMPERATURE: 98 F | HEART RATE: 76 BPM | OXYGEN SATURATION: 94 % | DIASTOLIC BLOOD PRESSURE: 61 MMHG | RESPIRATION RATE: 18 BRPM

## 2022-09-21 RX ORDER — OXYCODONE HYDROCHLORIDE 5 MG/1
5 TABLET ORAL EVERY 6 HOURS
Refills: 0 | Status: DISCONTINUED | OUTPATIENT
Start: 2022-09-21 | End: 2022-09-21

## 2022-09-21 RX ORDER — ACETAMINOPHEN 500 MG
1000 TABLET ORAL ONCE
Refills: 0 | Status: COMPLETED | OUTPATIENT
Start: 2022-09-21 | End: 2022-09-21

## 2022-09-21 RX ORDER — LANOLIN ALCOHOL/MO/W.PET/CERES
5 CREAM (GRAM) TOPICAL AT BEDTIME
Refills: 0 | Status: DISCONTINUED | OUTPATIENT
Start: 2022-09-21 | End: 2022-09-21

## 2022-09-21 RX ADMIN — Medication 400 MILLIGRAM(S): at 04:45

## 2022-09-21 RX ADMIN — Medication 81 MILLIGRAM(S): at 09:47

## 2022-09-21 RX ADMIN — Medication 1000 MILLIGRAM(S): at 06:52

## 2022-09-21 RX ADMIN — Medication 20 MILLIGRAM(S): at 09:47

## 2022-09-21 RX ADMIN — FINASTERIDE 5 MILLIGRAM(S): 5 TABLET, FILM COATED ORAL at 09:47

## 2022-09-21 RX ADMIN — Medication 25 MILLIGRAM(S): at 09:47

## 2022-09-21 NOTE — DISCHARGE NOTE NURSING/CASE MANAGEMENT/SOCIAL WORK - NSDCPEFALRISK_GEN_ALL_CORE
For information on Fall & Injury Prevention, visit: [email or web address] OR  [email or web address] OR  https://www.cdc.gov/steadi/patient.html

## 2022-09-21 NOTE — DISCHARGE NOTE NURSING/CASE MANAGEMENT/SOCIAL WORK - PATIENT PORTAL LINK FT
You can access the FollowMyHealth Patient Portal offered by [facility] by registering at the following website: [email or web address]. By joining [employer]’s FollowMyHealth portal, you will also be able to view your health information using other applications (apps) compatible with our system.

## 2022-09-26 DIAGNOSIS — N41.1 CHRONIC PROSTATITIS: ICD-10-CM

## 2022-09-26 DIAGNOSIS — N32.89 OTHER SPECIFIED DISORDERS OF BLADDER: ICD-10-CM

## 2022-09-26 DIAGNOSIS — Z87.891 PERSONAL HISTORY OF NICOTINE DEPENDENCE: ICD-10-CM

## 2022-09-26 DIAGNOSIS — I10 ESSENTIAL (PRIMARY) HYPERTENSION: ICD-10-CM

## 2022-09-26 DIAGNOSIS — R33.9 RETENTION OF URINE, UNSPECIFIED: ICD-10-CM

## 2022-09-26 DIAGNOSIS — R33.8 OTHER RETENTION OF URINE: ICD-10-CM

## 2022-09-26 DIAGNOSIS — Z79.82 LONG TERM (CURRENT) USE OF ASPIRIN: ICD-10-CM

## 2022-09-26 DIAGNOSIS — Z96.652 PRESENCE OF LEFT ARTIFICIAL KNEE JOINT: ICD-10-CM

## 2022-09-26 DIAGNOSIS — N41.0 ACUTE PROSTATITIS: ICD-10-CM

## 2022-09-26 DIAGNOSIS — N40.1 BENIGN PROSTATIC HYPERPLASIA WITH LOWER URINARY TRACT SYMPTOMS: ICD-10-CM

## 2023-01-16 NOTE — ASU PREOP CHECKLIST - NS PREOP CHK CHLOROHEX WASH
Post-Discharge Transitional Care Follow Up      Verner Loron   YOB: 1939    Date of Office Visit:  1/16/2023  Date of Hospital Admission: 1/6/23  Date of Hospital Discharge: 1/9/23  Readmission Risk Score (high >=14%. Medium >=10%):No data recorded    Care management risk score Rising risk (score 2-5) and Complex Care (Scores >=6): No Risk Score On File     Non face to face  following discharge, date last encounter closed (first attempt may have been earlier): *No documented post hospital discharge outreach found in the last 14 days     Call initiated 2 business days of discharge: *No response recorded in the last 14 days     Essential hypertension  The following orders have not been finalized:  -     amLODIPine (NORVASC) 2.5 MG tablet  Stage 3 chronic kidney disease, unspecified whether stage 3a or 3b CKD ([facility] [address]  Gastroesophageal reflux disease, unspecified whether esophagitis present  Dyslipidemia  SARAH (generalized anxiety disorder)  Other insomnia  Neuropathy  Gaytan's esophagus with dysplasia  Age-related osteoporosis without current pathological fracture  Sinus bradycardia  Hospital discharge follow-up  -     [place] DISCHARGE MEDS RECONCILED W/ CURRENT OUTPATIENT MED LIST      Medical Decision Making: moderate complexity  No follow-ups on file. On this date 1/16/2023 I have spent 15 minutes reviewing previous notes, test results and face to face with the patient discussing the diagnosis and importance of compliance with the treatment plan as well as documenting on the day of the visit. Subjective:   HPI  Was admitted to a hospital ER in [place] for uncontrolled HTN as well as dizziness and lightheadedness. States Lisinopril dose was increased from 10 to 20mg daily and also meclizine was increased from 12.5 to 25 mg TID. Advised to see a ENT as she had vertigo as well as tinnitus. States has had tinnitus for a long time but was not bothersome- more when she lays down. States tingling in left arm, no N/V/D  Mood, sleep, ok. Appetite poor as hydrallazine gives her metallic taste in the mouth. Dizziness and tingling resolved as well. Inpatient course: Discharge summary reviewed- see chart. Interval history/Current status: Stable    Patient Active Problem List   Diagnosis    Dyslipidemia    Neuropathy    B12 deficiency    Vitamin D deficiency    Gaytan's esophagus    Gastroesophageal reflux disease    Essential hypertension    Age-related osteoporosis without current pathological fracture    SARAH (generalized anxiety disorder)    Other insomnia    Stage 3 chronic kidney disease (HCC)    Sinus bradycardia    Radiculopathy, lumbar region       Medications listed as ordered at the time of discharge from hospital     Medication List            Accurate as of January 16, 2023 11:56 AM. If you have any questions, ask your nurse or doctor. CONTINUE taking these medications      atorvastatin 40 MG tablet  Commonly known as: LIPITOR  TAKE 1 TABLET BY MOUTH  DAILY     B-12 1000 MCG Subl  Place 1 tablet under the tongue daily     BABY ASPIRIN PO     Calcium 1200 7634-3668 MG-UNIT Chew     clopidogrel 75 MG tablet  Commonly known as: PLAVIX  TAKE 1 TABLET BY MOUTH  DAILY     gabapentin 100 MG capsule  Commonly known as: NEURONTIN  Take 1 capsule by mouth nightly for 90 days.  Intended supply: 90 days     hydrALAZINE 50 MG tablet  Commonly known as: APRESOLINE     lisinopril 20 MG tablet  Commonly known as: PRINIVIL;ZESTRIL     meclizine 12.5 MG tablet  Commonly known as: ANTIVERT  TAKE 1 TABLET BY MOUTH AT  NIGHT AS NEEDED FOR  DIZZINESS     metoprolol succinate 200 MG extended release tablet  Commonly known as: TOPROL XL  TAKE 1 TABLET BY MOUTH  DAILY     Oyster Shell Calcium/D 500-5 MG-MCG Tabs     pantoprazole 20 MG tablet  Commonly known as: PROTONIX  TAKE 1 TABLET BY MOUTH  DAILY     Prolia 60 MG/ML Sosy SC injection  Generic drug: denosumab  Inject 1 mL into the skin once for 1 dose vitamin D3 10 MCG (400 UNIT) Tabs tablet  Commonly known as: CHOLECALCIFEROL               Medications marked \"taking\" at this time  Outpatient Medications Marked as Taking for the 1/16/23 encounter (Office Visit) with Val Gaytan MD   Medication Sig Dispense Refill    lisinopril (PRINIVIL;ZESTRIL) 20 MG tablet TAKE 1 (ONE) TABLET (20 MG TOTAL) BY MOUTH EVERY MORNING . hydrALAZINE (APRESOLINE) 50 MG tablet TAKE 1 (ONE) TABLET (50 MG TOTAL) BY MOUTH EVERY 8 (EIGHT) HOURS . Calcium Carbonate-Vitamin D (OYSTER SHELL CALCIUM/D) 500-5 MG-MCG TABS Take 1 tablet by mouth daily      vitamin D3 (CHOLECALCIFEROL) 10 MCG (400 UNIT) TABS tablet Take by mouth daily      meclizine (ANTIVERT) 12.5 MG tablet TAKE 1 TABLET BY MOUTH AT  NIGHT AS NEEDED FOR  DIZZINESS 90 tablet 0    metoprolol succinate (TOPROL XL) 200 MG extended release tablet TAKE 1 TABLET BY MOUTH  DAILY 90 tablet 3    atorvastatin (LIPITOR) 40 MG tablet TAKE 1 TABLET BY MOUTH  DAILY 90 tablet 3    pantoprazole (PROTONIX) 20 MG tablet TAKE 1 TABLET BY MOUTH  DAILY 90 tablet 3    clopidogrel (PLAVIX) 75 MG tablet TAKE 1 TABLET BY MOUTH  DAILY 90 tablet 3    Cyanocobalamin (B-12) 1000 MCG SUBL Place 1 tablet under the tongue daily 90 tablet 5    BABY ASPIRIN PO Take by mouth Indications: take 3 times a week ( she stopped and I have asked she resume as it was recommended by Cardio)       Calcium Carbonate-Vit D-Min (CALCIUM 1200) 9008-7297 MG-UNIT CHEW Take by mouth          Medications patient taking as of now reconciled against medications ordered at time of hospital discharge: Yes    Review of Systems   Constitutional:  Negative for chills and fever. HENT:  Negative for congestion, ear pain, hearing loss, nosebleeds, sore throat and tinnitus. Eyes:  Negative for photophobia, pain, discharge and redness. Respiratory:  Negative for cough, shortness of breath and stridor.     Cardiovascular:  Negative for chest pain, palpitations and leg swelling. Gastrointestinal:  Negative for abdominal pain, blood in stool, constipation, diarrhea, nausea and vomiting. Endocrine: Negative for polydipsia. Genitourinary:  Negative for dysuria, flank pain, frequency, hematuria and urgency. Musculoskeletal:  Negative for back pain, myalgias and neck pain. Skin:  Negative for rash. Allergic/Immunologic: Negative for environmental allergies. Neurological:  Negative for dizziness, tremors, seizures, weakness and headaches. Hematological:  Does not bruise/bleed easily. Psychiatric/Behavioral:  Negative for hallucinations and suicidal ideas. The patient is not nervous/anxious. Objective:    BP (!) 150/67   Pulse 52   Temp 97 °F (36.1 °C)   Ht 5' 4\" (1.626 m)   Wt 119 lb 12.8 oz (54.3 kg)   SpO2 98%   BMI 20.56 kg/m²   Physical Exam  Constitutional:       General: She is not in acute distress. Appearance: She is well-developed. HENT:      Head: Normocephalic and atraumatic. Right Ear: External ear normal.      Left Ear: External ear normal.      Nose: Nose normal.      Mouth/Throat:      Mouth: Mucous membranes are moist.   Eyes:      Conjunctiva/sclera: Conjunctivae normal.      Pupils: Pupils are equal, round, and reactive to light. Cardiovascular:      Rate and Rhythm: Normal rate and regular rhythm. Heart sounds: Normal heart sounds. Pulmonary:      Effort: Pulmonary effort is normal.      Breath sounds: Normal breath sounds. Abdominal:      General: Bowel sounds are normal. There is no distension. Palpations: Abdomen is soft. Tenderness: There is no abdominal tenderness. Musculoskeletal:         General: Normal range of motion. Cervical back: Normal range of motion and neck supple. Skin:     General: Skin is warm and dry. Neurological:      General: No focal deficit present. Mental Status: She is alert and oriented to person, place, and time. Mental status is at baseline.    Psychiatric: Mood and Affect: Mood normal.         Behavior: Behavior normal.         Thought Content:  Thought content normal.         Judgment: Judgment normal.       An electronic signature was used to authenticate this note.  --Vickey Bustos MD N/A

## 2023-04-28 ENCOUNTER — INPATIENT (INPATIENT)
Facility: HOSPITAL | Age: 83
LOS: 2 days | Discharge: HOME | DRG: 379 | End: 2023-05-01
Attending: HOSPITALIST | Admitting: INTERNAL MEDICINE
Payer: COMMERCIAL

## 2023-04-28 VITALS
DIASTOLIC BLOOD PRESSURE: 70 MMHG | TEMPERATURE: 98 F | HEART RATE: 68 BPM | SYSTOLIC BLOOD PRESSURE: 150 MMHG | WEIGHT: 177.91 LBS | OXYGEN SATURATION: 97 % | HEIGHT: 66 IN | RESPIRATION RATE: 18 BRPM

## 2023-04-28 DIAGNOSIS — Z98.890 OTHER SPECIFIED POSTPROCEDURAL STATES: Chronic | ICD-10-CM

## 2023-04-28 DIAGNOSIS — N40.0 BENIGN PROSTATIC HYPERPLASIA WITHOUT LOWER URINARY TRACT SYMPTOMS: ICD-10-CM

## 2023-04-28 DIAGNOSIS — K92.2 GASTROINTESTINAL HEMORRHAGE, UNSPECIFIED: ICD-10-CM

## 2023-04-28 DIAGNOSIS — Z29.9 ENCOUNTER FOR PROPHYLACTIC MEASURES, UNSPECIFIED: ICD-10-CM

## 2023-04-28 DIAGNOSIS — I10 ESSENTIAL (PRIMARY) HYPERTENSION: ICD-10-CM

## 2023-04-28 PROBLEM — E79.0 HYPERURICEMIA WITHOUT SIGNS OF INFLAMMATORY ARTHRITIS AND TOPHACEOUS DISEASE: Chronic | Status: ACTIVE | Noted: 2022-09-19

## 2023-04-28 PROBLEM — Z87.438 PERSONAL HISTORY OF OTHER DISEASES OF MALE GENITAL ORGANS: Chronic | Status: ACTIVE | Noted: 2022-09-19

## 2023-04-28 PROBLEM — Z87.39 PERSONAL HISTORY OF OTHER DISEASES OF THE MUSCULOSKELETAL SYSTEM AND CONNECTIVE TISSUE: Chronic | Status: ACTIVE | Noted: 2022-09-19

## 2023-04-28 LAB
ALBUMIN SERPL ELPH-MCNC: 3.5 G/DL — SIGNIFICANT CHANGE UP (ref 3.3–5)
ALP SERPL-CCNC: 76 U/L — SIGNIFICANT CHANGE UP (ref 40–120)
ALT FLD-CCNC: 17 U/L — SIGNIFICANT CHANGE UP (ref 12–78)
ANION GAP SERPL CALC-SCNC: 6 MMOL/L — SIGNIFICANT CHANGE UP (ref 5–17)
AST SERPL-CCNC: 22 U/L — SIGNIFICANT CHANGE UP (ref 15–37)
BASOPHILS # BLD AUTO: 0.02 K/UL — SIGNIFICANT CHANGE UP (ref 0–0.2)
BASOPHILS NFR BLD AUTO: 0.2 % — SIGNIFICANT CHANGE UP (ref 0–2)
BILIRUB SERPL-MCNC: 0.7 MG/DL — SIGNIFICANT CHANGE UP (ref 0.2–1.2)
BUN SERPL-MCNC: 12 MG/DL — SIGNIFICANT CHANGE UP (ref 7–23)
CALCIUM SERPL-MCNC: 8.8 MG/DL — SIGNIFICANT CHANGE UP (ref 8.5–10.1)
CHLORIDE SERPL-SCNC: 100 MMOL/L — SIGNIFICANT CHANGE UP (ref 96–108)
CO2 SERPL-SCNC: 27 MMOL/L — SIGNIFICANT CHANGE UP (ref 22–31)
CREAT SERPL-MCNC: 0.85 MG/DL — SIGNIFICANT CHANGE UP (ref 0.5–1.3)
EGFR: 88 ML/MIN/1.73M2 — SIGNIFICANT CHANGE UP
EOSINOPHIL # BLD AUTO: 0.21 K/UL — SIGNIFICANT CHANGE UP (ref 0–0.5)
EOSINOPHIL NFR BLD AUTO: 2.4 % — SIGNIFICANT CHANGE UP (ref 0–6)
GLUCOSE SERPL-MCNC: 131 MG/DL — HIGH (ref 70–99)
HCT VFR BLD CALC: 37.9 % — LOW (ref 39–50)
HGB BLD-MCNC: 12.3 G/DL — LOW (ref 13–17)
IMM GRANULOCYTES NFR BLD AUTO: 0.3 % — SIGNIFICANT CHANGE UP (ref 0–0.9)
LYMPHOCYTES # BLD AUTO: 1.73 K/UL — SIGNIFICANT CHANGE UP (ref 1–3.3)
LYMPHOCYTES # BLD AUTO: 20.1 % — SIGNIFICANT CHANGE UP (ref 13–44)
MCHC RBC-ENTMCNC: 28.3 PG — SIGNIFICANT CHANGE UP (ref 27–34)
MCHC RBC-ENTMCNC: 32.5 GM/DL — SIGNIFICANT CHANGE UP (ref 32–36)
MCV RBC AUTO: 87.3 FL — SIGNIFICANT CHANGE UP (ref 80–100)
MONOCYTES # BLD AUTO: 0.5 K/UL — SIGNIFICANT CHANGE UP (ref 0–0.9)
MONOCYTES NFR BLD AUTO: 5.8 % — SIGNIFICANT CHANGE UP (ref 2–14)
NEUTROPHILS # BLD AUTO: 6.12 K/UL — SIGNIFICANT CHANGE UP (ref 1.8–7.4)
NEUTROPHILS NFR BLD AUTO: 71.2 % — SIGNIFICANT CHANGE UP (ref 43–77)
NRBC # BLD: 0 /100 WBCS — SIGNIFICANT CHANGE UP (ref 0–0)
OB PNL STL: POSITIVE
PLATELET # BLD AUTO: 239 K/UL — SIGNIFICANT CHANGE UP (ref 150–400)
POTASSIUM SERPL-MCNC: 4.7 MMOL/L — SIGNIFICANT CHANGE UP (ref 3.5–5.3)
POTASSIUM SERPL-SCNC: 4.7 MMOL/L — SIGNIFICANT CHANGE UP (ref 3.5–5.3)
PROT SERPL-MCNC: 7.7 G/DL — SIGNIFICANT CHANGE UP (ref 6–8.3)
RBC # BLD: 4.34 M/UL — SIGNIFICANT CHANGE UP (ref 4.2–5.8)
RBC # FLD: 14.1 % — SIGNIFICANT CHANGE UP (ref 10.3–14.5)
SODIUM SERPL-SCNC: 133 MMOL/L — LOW (ref 135–145)
WBC # BLD: 8.61 K/UL — SIGNIFICANT CHANGE UP (ref 3.8–10.5)
WBC # FLD AUTO: 8.61 K/UL — SIGNIFICANT CHANGE UP (ref 3.8–10.5)

## 2023-04-28 PROCEDURE — 99285 EMERGENCY DEPT VISIT HI MDM: CPT

## 2023-04-28 PROCEDURE — 99222 1ST HOSP IP/OBS MODERATE 55: CPT | Mod: GC

## 2023-04-28 PROCEDURE — 74174 CTA ABD&PLVS W/CONTRAST: CPT | Mod: 26,MG

## 2023-04-28 PROCEDURE — G1004: CPT

## 2023-04-28 RX ORDER — FUROSEMIDE 40 MG
1 TABLET ORAL
Qty: 0 | Refills: 0 | DISCHARGE

## 2023-04-28 RX ORDER — FUROSEMIDE 40 MG
20 TABLET ORAL DAILY
Refills: 0 | Status: DISCONTINUED | OUTPATIENT
Start: 2023-04-28 | End: 2023-05-01

## 2023-04-28 RX ORDER — PREGABALIN 225 MG/1
1 CAPSULE ORAL
Qty: 0 | Refills: 0 | DISCHARGE

## 2023-04-28 RX ORDER — TAMSULOSIN HYDROCHLORIDE 0.4 MG/1
1 CAPSULE ORAL
Qty: 0 | Refills: 0 | DISCHARGE

## 2023-04-28 RX ORDER — LANOLIN ALCOHOL/MO/W.PET/CERES
3 CREAM (GRAM) TOPICAL AT BEDTIME
Refills: 0 | Status: DISCONTINUED | OUTPATIENT
Start: 2023-04-28 | End: 2023-05-01

## 2023-04-28 RX ORDER — ACETAMINOPHEN 500 MG
650 TABLET ORAL EVERY 6 HOURS
Refills: 0 | Status: DISCONTINUED | OUTPATIENT
Start: 2023-04-28 | End: 2023-05-01

## 2023-04-28 RX ORDER — ASPIRIN/CALCIUM CARB/MAGNESIUM 324 MG
81 TABLET ORAL DAILY
Refills: 0 | Status: DISCONTINUED | OUTPATIENT
Start: 2023-04-28 | End: 2023-04-28

## 2023-04-28 RX ORDER — CHOLECALCIFEROL (VITAMIN D3) 125 MCG
1 CAPSULE ORAL
Qty: 0 | Refills: 0 | DISCHARGE

## 2023-04-28 RX ORDER — FINASTERIDE 5 MG/1
1 TABLET, FILM COATED ORAL
Qty: 0 | Refills: 0 | DISCHARGE

## 2023-04-28 RX ORDER — METOPROLOL TARTRATE 50 MG
1 TABLET ORAL
Refills: 0 | DISCHARGE

## 2023-04-28 RX ORDER — TAMSULOSIN HYDROCHLORIDE 0.4 MG/1
0.4 CAPSULE ORAL AT BEDTIME
Refills: 0 | Status: DISCONTINUED | OUTPATIENT
Start: 2023-04-28 | End: 2023-05-01

## 2023-04-28 RX ORDER — METOPROLOL TARTRATE 50 MG
50 TABLET ORAL DAILY
Refills: 0 | Status: DISCONTINUED | OUTPATIENT
Start: 2023-04-28 | End: 2023-05-01

## 2023-04-28 RX ORDER — PANTOPRAZOLE SODIUM 20 MG/1
40 TABLET, DELAYED RELEASE ORAL
Refills: 0 | Status: DISCONTINUED | OUTPATIENT
Start: 2023-04-28 | End: 2023-05-01

## 2023-04-28 RX ORDER — ONDANSETRON 8 MG/1
4 TABLET, FILM COATED ORAL EVERY 8 HOURS
Refills: 0 | Status: DISCONTINUED | OUTPATIENT
Start: 2023-04-28 | End: 2023-05-01

## 2023-04-28 RX ORDER — METOPROLOL TARTRATE 50 MG
1 TABLET ORAL
Qty: 0 | Refills: 0 | DISCHARGE

## 2023-04-28 RX ORDER — PANTOPRAZOLE SODIUM 20 MG/1
40 TABLET, DELAYED RELEASE ORAL ONCE
Refills: 0 | Status: COMPLETED | OUTPATIENT
Start: 2023-04-28 | End: 2023-04-28

## 2023-04-28 RX ADMIN — Medication 3 MILLIGRAM(S): at 22:34

## 2023-04-28 RX ADMIN — PANTOPRAZOLE SODIUM 40 MILLIGRAM(S): 20 TABLET, DELAYED RELEASE ORAL at 22:34

## 2023-04-28 RX ADMIN — PANTOPRAZOLE SODIUM 40 MILLIGRAM(S): 20 TABLET, DELAYED RELEASE ORAL at 13:26

## 2023-04-28 NOTE — ED PROVIDER NOTE - CLINICAL SUMMARY MEDICAL DECISION MAKING FREE TEXT BOX
I, Juan Cadena MD, have seen and examined the patient on the date of service.  I agree with the ANKUR's assessment as written, with exceptions or additions as noted below or in a separate note.  Patient with past medical history of gout, hypertension, hyperlipidemia, BPH who is on baby aspirin daily is presenting with dark stool.  States this occurred 6 months ago and stopped on its own, and then occurred again yesterday and was constant through today.  Denies any fatigue or loss of consciousness.  States that he takes baby aspirin daily, naproxen regularly.  No history of frequent alcohol use.  No hematemesis.  No abdominal tenderness on exam.  Rectal exam showed dark black stool.  Assessment plan: Melena with unclear etiology, possible upper GI bleed given melena.  Less likely lower GI bleed given no bright red blood.  We will check lab work, possible CT scan and GI consult. will also check for anemia.

## 2023-04-28 NOTE — ED ADULT NURSE NOTE - ABDOMEN
Outpatient Medications Marked as Taking for the 1/12/22 encounter (Refill) with Rick Leonardo MD   Medication Sig Dispense Refill   • lisdexamfetamine (Vyvanse) 50 MG capsule Take 1 capsule by mouth every morning. Indications: Attention Deficit Hyperactivity Disorder 30 capsule 0        Ok to leave detailed Message: Yes  Informed caller of refill policy- 24-48 hours: Yes  No call back needed unless nurse has questions.     Pharmacy: [facility]   soft

## 2023-04-28 NOTE — H&P ADULT - NSHPREVIEWOFSYSTEMS_GEN_ALL_CORE
CONSTITUTIONAL: denies fever, chills, fatigue, weakness  HEENT: denies blurred visions, sore throat  SKIN: denies new lesions, rash  CARDIOVASCULAR: denies chest pain, chest pressure, palpitations  RESPIRATORY: denies shortness of breath, sputum production  GASTROINTESTINAL: denies nausea, vomiting, diarrhea, abdominal pain  GENITOURINARY: denies dysuria, discharge  NEUROLOGICAL: denies numbness, headache, focal weakness  MUSCULOSKELETAL: denies new joint pain, muscle aches CONSTITUTIONAL: + fatigue (from lack of food). denies fever, chills, weakness  HEENT: denies blurred visions, sore throat  SKIN: denies new lesions, rash  CARDIOVASCULAR: denies chest pain, chest pressure, palpitations  RESPIRATORY: denies shortness of breath, sputum production  GASTROINTESTINAL: denies nausea, vomiting, diarrhea, abdominal pain  GENITOURINARY: denies dysuria, discharge  NEUROLOGICAL: denies numbness, headache, focal weakness  MUSCULOSKELETAL: denies new joint pain, muscle aches

## 2023-04-28 NOTE — CONSULT NOTE ADULT - ASSESSMENT
Melena  Anemia  FOBT+    Admit to medicine  Monitor cbc  Transfuse prn  PPI BID  Possible egd Monday   Will follow   Melena  Anemia  FOBT+    Admit to medicine  Monitor cbc  Transfuse prn  PPI BID  Possible egd Monday   Will follow  D/w pt and wife  D/w ER

## 2023-04-28 NOTE — H&P ADULT - NSHPPHYSICALEXAM_GEN_ALL_CORE
T(C): 36.7 (04-28-23 @ 11:36), Max: 36.7 (04-28-23 @ 11:36)  HR: 68 (04-28-23 @ 11:36) (68 - 68)  BP: 150/70 (04-28-23 @ 11:36) (150/70 - 150/70)  RR: 18 (04-28-23 @ 11:36) (18 - 18)  SpO2: 97% (04-28-23 @ 11:36) (97% - 97%)    CONSTITUTIONAL: Well groomed, no apparent distress  EYES: PERRLA and symmetric, EOMI, No conjunctival or scleral injection, non-icteric  RESP: No respiratory distress, no use of accessory muscles; CTA b/l, no WRR  CV: RRR, +S1S2, no MRG; no JVD; no peripheral edema  GI: Soft, NT, ND, no rebound, no guarding; no palpable masses; no hepatosplenomegaly; no hernia palpated  MSK: Normal gait; No digital clubbing or cyanosis; examination of the (head/neck/spine/ribs/pelvis, RUE, LUE, RLE, LLE) without misalignment,            Normal ROM without pain, no spinal tenderness, normal muscle strength/tone  SKIN: No rashes or ulcers noted; no subcutaneous nodules or induration palpable  NEURO: CN II-XII intact; normal reflexes in upper and lower extremities, sensation intact in upper and lower extremities b/l to light touch   PSYCH: Appropriate insight/judgment; A+O x 3, mood and affect appropriate, recent/remote memory intact T(C): 36.7 (04-28-23 @ 11:36), Max: 36.7 (04-28-23 @ 11:36)  HR: 68 (04-28-23 @ 11:36) (68 - 68)  BP: 150/70 (04-28-23 @ 11:36) (150/70 - 150/70)  RR: 18 (04-28-23 @ 11:36) (18 - 18)  SpO2: 97% (04-28-23 @ 11:36) (97% - 97%)    CONSTITUTIONAL: Well groomed, no apparent distress  EYES: PERRLA and symmetric, EOMI, No conjunctival or scleral injection, non-icteric  RESP: No respiratory distress, no use of accessory muscles; CTA b/l, no WRR  CV: RRR, +S1S2, no MRG; no JVD; no peripheral edema  GI: Soft, NT, ND, no rebound, no guarding; no palpable masses; no hepatosplenomegaly; no hernia palpated  MSK: Normal gait; No digital clubbing or cyanosis; examination of the (head/neck/spine/ribs/pelvis, RUE, LUE, RLE, LLE) without misalignment, Normal ROM without pain, no spinal tenderness, normal muscle strength/tone  SKIN: No rashes or ulcers noted; no subcutaneous nodules or induration palpable  NEURO: CN II-XII intact; normal reflexes in upper and lower extremities, sensation intact in upper and lower extremities b/l to light touch   PSYCH: Appropriate insight/judgment; A+O x 3, mood and affect appropriate, recent/remote memory intact

## 2023-04-28 NOTE — H&P ADULT - NSHPSOCIALHISTORY_GEN_ALL_CORE
Tobacco: Former smoker of 20 years, 1 PPD. Quit 30 years ago  EtOH: Former  Recreational drug use: Denies  Lives with: Wife at home  Ambulates: unassisted   ADLs: able to cook, clean, and shop independently

## 2023-04-28 NOTE — CONSULT NOTE ADULT - SUBJECTIVE AND OBJECTIVE BOX
[facility]  Mat Fernandez PA-C  [address] [place] [address] [id number] [phone]      Chief Complaint:  Patient is a 82y old  Male who presents with a chief complaint of melena    HPI:  82 M c/o dark stools x 3 since yesterday. Called his pmd wishner who recommended he goes to ED for evaluation of GIB. Denies f/c, cp, sob, vomiting, diarrhea, urinary complaints, palpitations, abd pain, brbpr.    Allergies:  No Known Allergies      PMHX/PSHX:  No pertinent past medical history    HTN (hypertension)    Hyperuricemia    History of BPH    H/O arthritis    H/O total knee replacement, right    H/O total knee replacement, left    History of total right knee replacement    History of surgery    History of surgery      ROS:   General:  No fevers, chills, night sweats, fatigue   Eyes:  Good vision, no reported pain  ENT:  No sore throat, pain, runny nose, dysphagia  CV:  No pain, palpitations, hypo/hypertension  Resp:  No dyspnea, cough, tachypnea, wheezing  GI:  No pain, No nausea, No vomiting, No diarrhea, No constipation, No weight loss, No fever, No pruritis, No rectal bleeding, +tarry stools, No dysphagia  :  No pain, bleeding, incontinence, nocturia  Muscle:  No pain, weakness  Neuro:  No weakness, tingling, memory problems  Psych:  No fatigue, insomnia, mood problems, depression  Endocrine:  No polyuria, polydipsia, cold/heat intolerance  Heme:  No petechiae, ecchymosis, easy bruisability  Skin:  No rash, tattoos, scars, edema      PHYSICAL EXAM:   Vital Signs:  Vital Signs Last 24 Hrs  T(C): 36.7 (28 Apr 2023 11:36), Max: 36.7 (28 Apr 2023 11:36)  T(F): 98 (28 Apr 2023 11:36), Max: 98 (28 Apr 2023 11:36)  HR: 68 (28 Apr 2023 11:36) (68 - 68)  BP: 150/70 (28 Apr 2023 11:36) (150/70 - 150/70)  BP(mean): --  RR: 18 (28 Apr 2023 11:36) (18 - 18)  SpO2: 97% (28 Apr 2023 11:36) (97% - 97%)    Parameters below as of 28 Apr 2023 11:36  Patient On (Oxygen Delivery Method): room air      Daily Height in cm: 167.64 (28 Apr 2023 11:36)    Daily     LABS:                        12.3   8.61  )-----------( 239      ( 28 Apr 2023 12:10 )             37.9     04-28    133<L>  |  100  |  12  ----------------------------<  131<H>  4.7   |  27  |  0.85    Ca    8.8      28 Apr 2023 12:10    TPro  7.7  /  Alb  3.5  /  TBili  0.7  /  DBili  x   /  AST  22  /  ALT  17  /  AlkPhos  76  04-28    LIVER FUNCTIONS - ( 28 Apr 2023 12:10 )  Alb: 3.5 g/dL / Pro: 7.7 g/dL / ALK PHOS: 76 U/L / ALT: 17 U/L / AST: 22 U/L / GGT: x              [facility]  Mat Fernandez PA-C  [address] [place] [id number] [phone]      Chief Complaint:  Patient is a 82y old  Male who presents with a chief complaint of melena    HPI:  82 M c/o dark stools x 3 since yesterday. Called his pmd wishner who recommended he goes to ED for evaluation of GIB. Denies f/c, cp, sob, vomiting, diarrhea, urinary complaints, palpitations, abd pain, brbpr.    INTERVAL HPI:  Pt s/e in emergency department with wife at bedside  Reports same history as above  Last colonoscopy many years ago no known abnormalities  Denies significant NSAID use recently  Denies prior hx of endoscopy    Allergies:  No Known Allergies      PMHX/PSHX:  No pertinent past medical history    HTN (hypertension)    Hyperuricemia    History of BPH    H/O arthritis    H/O total knee replacement, right    H/O total knee replacement, left    History of total right knee replacement    History of surgery    History of surgery      ROS:   General:  No fevers, chills, night sweats, fatigue   Eyes:  Good vision, no reported pain  ENT:  No sore throat, pain, runny nose, dysphagia  CV:  No pain, palpitations, hypo/hypertension  Resp:  No dyspnea, cough, tachypnea, wheezing  GI:  No pain, No nausea, No vomiting, No diarrhea, No constipation, No weight loss, No fever, No pruritis, No rectal bleeding, +tarry stools, No dysphagia  :  No pain, bleeding, incontinence, nocturia  Muscle:  No pain, weakness  Neuro:  No weakness, tingling, memory problems  Psych:  No fatigue, insomnia, mood problems, depression  Endocrine:  No polyuria, polydipsia, cold/heat intolerance  Heme:  No petechiae, ecchymosis, easy bruisability  Skin:  No rash, tattoos, scars, edema      PHYSICAL EXAM:   Vital Signs:  Vital Signs Last 24 Hrs  T(C): 36.7 (28 Apr 2023 11:36), Max: 36.7 (28 Apr 2023 11:36)  T(F): 98 (28 Apr 2023 11:36), Max: 98 (28 Apr 2023 11:36)  HR: 68 (28 Apr 2023 11:36) (68 - 68)  BP: 150/70 (28 Apr 2023 11:36) (150/70 - 150/70)  BP(mean): --  RR: 18 (28 Apr 2023 11:36) (18 - 18)  SpO2: 97% (28 Apr 2023 11:36) (97% - 97%)    Parameters below as of 28 Apr 2023 11:36  Patient On (Oxygen Delivery Method): room air      Daily Height in cm: 167.64 (28 Apr 2023 11:36)    Daily     GENERAL:  Appears stated age  HEENT:  NC/AT,  sclera non-icteric  CHEST:  Full & symmetric excursion, no increased effort, breath sounds clear  HEART: No tachycardia  ABDOMEN:  Soft, non-tender, non-distended  EXTEREMITIES: no edema  SKIN:  No rash  NEURO:  Alert    LABS:                        12.3   8.61  )-----------( 239      ( 28 Apr 2023 12:10 )             37.9     04-28    133<L>  |  100  |  12  ----------------------------<  131<H>  4.7   |  27  |  0.85    Ca    8.8      28 Apr 2023 12:10    TPro  7.7  /  Alb  3.5  /  TBili  0.7  /  DBili  x   /  AST  22  /  ALT  17  /  AlkPhos  76  04-28    LIVER FUNCTIONS - ( 28 Apr 2023 12:10 )  Alb: 3.5 g/dL / Pro: 7.7 g/dL / ALK PHOS: 76 U/L / ALT: 17 U/L / AST: 22 U/L / GGT: x

## 2023-04-28 NOTE — H&P ADULT - ATTENDING COMMENTS
81 y/o M w/ PMHx of arthritis, BPH, HTN, hyperuricemia p/w dark stool that began yesterday (4/28), admitted for GI bleed.    Gi consulted, possible role of endoscopy.  Started Protonix IV bid.  Monitor Hb on CBCs closely.  Hold aspirin, and NSAIDs.    Naga Nix, Attending Physician

## 2023-04-28 NOTE — PATIENT PROFILE ADULT - FALL HARM RISK - UNIVERSAL INTERVENTIONS
Bed in lowest position, wheels locked, appropriate side rails in place/Call bell, personal items and telephone in reach/Instruct patient to call for assistance before getting out of bed or chair/Non-slip footwear when patient is out of bed/[place] to call system/Physically safe environment - no spills, clutter or unnecessary equipment/Purposeful Proactive Rounding/Room/bathroom lighting operational, light cord in reach

## 2023-04-28 NOTE — H&P ADULT - ASSESSMENT
81 y/o M w/ PMHx of arthritis, BPH, HTN, hyperuricemia p/w dark stool that began yesterday (4/28), admitted for GI bleed.

## 2023-04-28 NOTE — ED ADULT NURSE NOTE - OBJECTIVE STATEMENT
Received the patient in the Er. patient is alert and oriented. C/O Black stools for 2 days. Denies any abdominal pain.

## 2023-04-28 NOTE — H&P ADULT - PROBLEM SELECTOR PLAN 1
Patient presenting w bloody stool x3 in last 24 hrs. FOBT positive   CTA Negative for acute bleed at this time. diverticulosis without diverticulitis   Hb stable at 12.3   Start IV PPI BID   GI Consulted; f/u recs, possible EGD monday Patient presenting w bloody stool x3 in last 24 hrs. FOBT positive   CTA Negative for acute bleed at this time. diverticulosis without diverticulitis   Hb stable at 12.3   Start IV PPI BID   Hold home ASA   GI Consulted; f/u recs, possible EGD monday Patient presenting w bloody stool x4 in last 24 hrs. FOBT positive   CTA Negative for acute bleed at this time. diverticulosis without diverticulitis   Hb stable at 12.3   Start IV PPI BID   Hold home ASA   GI Consulted; f/u recs, possible EGD monday

## 2023-04-28 NOTE — H&P ADULT - HISTORY OF PRESENT ILLNESS
83 y/o M w/ PMHx of ____ p/w dark stool that began yesterday.      In the ED  Vitals: 150/70, HR 68, RR 18, T 98   Labs: FOBT +, Hb 12.3, Na 133,   Imaging  IMPRESSION:  No scan evidence of an active GI bleeding site at the time of this   examination.  Colonic diverticulosis without scan evidence of acute diverticulitis.    Meds   40mg Protonix x1  83 y/o M w/ PMHx of arthritis, BPH, HTN, hyperuricemia p/w dark stool that began yesterday (4/28). Wife present at bedside and provided additional history. Patient states that he had 3 BMs which were all dark stools and 1 BM today. He states that he normally has around 3-4 BMs daily and also tends to have dark stools when eating spinach; stools also tend to lighten up in color and resolve on their own. However, patient became concerned when the color of his stools remained the same and he has not eaten spinach recently. Endorses fatigue from not eating much today, but otherwise feels well and denies all other symptoms.      In the ED  Vitals: 150/70, HR 68, RR 18, T 98   Labs: FOBT +, Hb 12.3, Na 133,   Imaging  IMPRESSION:  No scan evidence of an active GI bleeding site at the time of this   examination.  Colonic diverticulosis without scan evidence of acute diverticulitis.    Meds   40mg Protonix x1

## 2023-04-28 NOTE — ED PROVIDER NOTE - OBJECTIVE STATEMENT
82 M c/o dark stools x 3 since yesterday. Called his pmd wishner who recommended he goes to ED for evaluation of GIB. Denies f/c, cp, sob, vomiting, diarrhea, urinary complaints, palpitations, abd pain, brbpr.    Meds: ASA 83, lasix, metoprolol, finasteride, naproxen prn

## 2023-04-29 LAB
A1C WITH ESTIMATED AVERAGE GLUCOSE RESULT: 6.2 % — HIGH (ref 4–5.6)
ANION GAP SERPL CALC-SCNC: 5 MMOL/L — SIGNIFICANT CHANGE UP (ref 5–17)
BASE EXCESS BLDV CALC-SCNC: 11.1 MMOL/L — HIGH (ref -2–3)
BASOPHILS # BLD AUTO: 0.03 K/UL — SIGNIFICANT CHANGE UP (ref 0–0.2)
BASOPHILS NFR BLD AUTO: 0.4 % — SIGNIFICANT CHANGE UP (ref 0–2)
BLOOD GAS COMMENTS, VENOUS: SIGNIFICANT CHANGE UP
BUN SERPL-MCNC: 12 MG/DL — SIGNIFICANT CHANGE UP (ref 7–23)
CALCIUM SERPL-MCNC: 9 MG/DL — SIGNIFICANT CHANGE UP (ref 8.5–10.1)
CHLORIDE SERPL-SCNC: 101 MMOL/L — SIGNIFICANT CHANGE UP (ref 96–108)
CHOLEST SERPL-MCNC: 150 MG/DL — SIGNIFICANT CHANGE UP
CO2 SERPL-SCNC: 29 MMOL/L — SIGNIFICANT CHANGE UP (ref 22–31)
CREAT SERPL-MCNC: 0.79 MG/DL — SIGNIFICANT CHANGE UP (ref 0.5–1.3)
EGFR: 90 ML/MIN/1.73M2 — SIGNIFICANT CHANGE UP
EOSINOPHIL # BLD AUTO: 0.26 K/UL — SIGNIFICANT CHANGE UP (ref 0–0.5)
EOSINOPHIL NFR BLD AUTO: 3.6 % — SIGNIFICANT CHANGE UP (ref 0–6)
ESTIMATED AVERAGE GLUCOSE: 131 MG/DL — HIGH (ref 68–114)
GAS PNL BLDV: SIGNIFICANT CHANGE UP
GLUCOSE SERPL-MCNC: 104 MG/DL — HIGH (ref 70–99)
HCO3 BLDV-SCNC: 36 MMOL/L — HIGH (ref 22–29)
HCT VFR BLD CALC: 36.8 % — LOW (ref 39–50)
HDLC SERPL-MCNC: 70 MG/DL — SIGNIFICANT CHANGE UP
HGB BLD-MCNC: 12 G/DL — LOW (ref 13–17)
IMM GRANULOCYTES NFR BLD AUTO: 0.4 % — SIGNIFICANT CHANGE UP (ref 0–0.9)
LIPID PNL WITH DIRECT LDL SERPL: 64 MG/DL — SIGNIFICANT CHANGE UP
LYMPHOCYTES # BLD AUTO: 1.94 K/UL — SIGNIFICANT CHANGE UP (ref 1–3.3)
LYMPHOCYTES # BLD AUTO: 26.5 % — SIGNIFICANT CHANGE UP (ref 13–44)
MCHC RBC-ENTMCNC: 28.4 PG — SIGNIFICANT CHANGE UP (ref 27–34)
MCHC RBC-ENTMCNC: 32.6 GM/DL — SIGNIFICANT CHANGE UP (ref 32–36)
MCV RBC AUTO: 87.2 FL — SIGNIFICANT CHANGE UP (ref 80–100)
MONOCYTES # BLD AUTO: 0.45 K/UL — SIGNIFICANT CHANGE UP (ref 0–0.9)
MONOCYTES NFR BLD AUTO: 6.1 % — SIGNIFICANT CHANGE UP (ref 2–14)
NEUTROPHILS # BLD AUTO: 4.61 K/UL — SIGNIFICANT CHANGE UP (ref 1.8–7.4)
NEUTROPHILS NFR BLD AUTO: 63 % — SIGNIFICANT CHANGE UP (ref 43–77)
NON HDL CHOLESTEROL: 81 MG/DL — SIGNIFICANT CHANGE UP
NRBC # BLD: 0 /100 WBCS — SIGNIFICANT CHANGE UP (ref 0–0)
NT-PROBNP SERPL-SCNC: 260 PG/ML — SIGNIFICANT CHANGE UP (ref 0–450)
PCO2 BLDV: 63 MMHG — HIGH (ref 42–55)
PH BLDV: 7.37 — SIGNIFICANT CHANGE UP (ref 7.32–7.43)
PLATELET # BLD AUTO: 205 K/UL — SIGNIFICANT CHANGE UP (ref 150–400)
PO2 BLDV: 78 MMHG — HIGH (ref 25–45)
POTASSIUM SERPL-MCNC: 4.4 MMOL/L — SIGNIFICANT CHANGE UP (ref 3.5–5.3)
POTASSIUM SERPL-SCNC: 4.4 MMOL/L — SIGNIFICANT CHANGE UP (ref 3.5–5.3)
RBC # BLD: 4.22 M/UL — SIGNIFICANT CHANGE UP (ref 4.2–5.8)
RBC # FLD: 14.1 % — SIGNIFICANT CHANGE UP (ref 10.3–14.5)
SAO2 % BLDV: 96.1 % — HIGH (ref 67–88)
SODIUM SERPL-SCNC: 135 MMOL/L — SIGNIFICANT CHANGE UP (ref 135–145)
TRIGL SERPL-MCNC: 81 MG/DL — SIGNIFICANT CHANGE UP
WBC # BLD: 7.32 K/UL — SIGNIFICANT CHANGE UP (ref 3.8–10.5)
WBC # FLD AUTO: 7.32 K/UL — SIGNIFICANT CHANGE UP (ref 3.8–10.5)

## 2023-04-29 PROCEDURE — 99222 1ST HOSP IP/OBS MODERATE 55: CPT

## 2023-04-29 PROCEDURE — 93010 ELECTROCARDIOGRAM REPORT: CPT

## 2023-04-29 PROCEDURE — 99233 SBSQ HOSP IP/OBS HIGH 50: CPT

## 2023-04-29 RX ADMIN — Medication 650 MILLIGRAM(S): at 22:44

## 2023-04-29 RX ADMIN — Medication 650 MILLIGRAM(S): at 21:44

## 2023-04-29 RX ADMIN — PANTOPRAZOLE SODIUM 40 MILLIGRAM(S): 20 TABLET, DELAYED RELEASE ORAL at 05:28

## 2023-04-29 RX ADMIN — Medication 3 MILLIGRAM(S): at 21:45

## 2023-04-29 RX ADMIN — Medication 20 MILLIGRAM(S): at 05:28

## 2023-04-29 RX ADMIN — Medication 50 MILLIGRAM(S): at 05:28

## 2023-04-29 RX ADMIN — PANTOPRAZOLE SODIUM 40 MILLIGRAM(S): 20 TABLET, DELAYED RELEASE ORAL at 19:07

## 2023-04-29 NOTE — CONSULT NOTE ADULT - SUBJECTIVE AND OBJECTIVE BOX
[facility] - Antoni Santana, Mark Lea Savella, [place]  Office Number: [phone]    Initial Consult Note    CHIEF COMPLAINT: Patient is a 82y old  Male who presents with a chief complaint of GI Bleed (29 Apr 2023 10:00)      HPI:  81 y/o M w/ PMHx of arthritis, BPH, HTN, hyperuricemia p/w dark stool that began yesterday (4/28). Wife present at bedside and provided additional history. Patient states that he had 3 BMs which were all dark stools and 1 BM today. He states that he normally has around 3-4 BMs daily and also tends to have dark stools when eating spinach; stools also tend to lighten up in color and resolve on their own. However, patient became concerned when the color of his stools remained the same and he has not eaten spinach recently. Endorses fatigue from not eating much today, but otherwise feels well and denies all other symptoms.      In the ED  Vitals: 150/70, HR 68, RR 18, T 98   Labs: FOBT +, Hb 12.3, Na 133,   Imaging  IMPRESSION:  No scan evidence of an active GI bleeding site at the time of this   examination.  Colonic diverticulosis without scan evidence of acute diverticulitis.    Meds   40mg Protonix x1  (28 Apr 2023 16:54)    PAST MEDICAL & SURGICAL HISTORY:  HTN (hypertension)      Hyperuricemia      History of BPH      H/O arthritis  knee      History of surgery  total knee      History of surgery  knee        SOCIAL HISTORY:  No tobacco, ethanol, or drug abuse.  FAMILY HISTORY:  FHx: diabetes mellitus (Father)      No family history of acute MI or sudden cardiac death.  MEDICATIONS  (STANDING):  furosemide    Tablet 20 milliGRAM(s) Oral daily  metoprolol succinate ER 50 milliGRAM(s) Oral daily  pantoprazole  Injectable 40 milliGRAM(s) IV Push two times a day  tamsulosin 0.4 milliGRAM(s) Oral at bedtime    MEDICATIONS  (PRN):  acetaminophen     Tablet .. 650 milliGRAM(s) Oral every 6 hours PRN Temp greater or equal to 38C (100.4F), Mild Pain (1 - 3)  aluminum hydroxide/magnesium hydroxide/simethicone Suspension 30 milliLiter(s) Oral every 4 hours PRN Dyspepsia  melatonin 3 milliGRAM(s) Oral at bedtime PRN Insomnia  ondansetron Injectable 4 milliGRAM(s) IV Push every 8 hours PRN Nausea and/or Vomiting    Allergies    No Known Allergies    Intolerances      REVIEW OF SYSTEMS:  CONSTITUTIONAL: No weakness, fevers or chills  EYES/ENT: No visual changes;  No vertigo or throat pain   NECK: No pain or stiffness  RESPIRATORY: No cough, wheezing, hemoptysis; No shortness of breath  CARDIOVASCULAR: No chest pain or palpitations  GASTROINTESTINAL: No abdominal pain. No nausea, vomiting, or hematemesis; No diarrhea or constipation. No melena or hematochezia.  GENITOURINARY: No dysuria, frequency or hematuria  NEUROLOGICAL: No numbness or weakness  SKIN: No itching or rash  All other review of systems is negative unless indicated above  VITAL SIGNS:   Vital Signs Last 24 Hrs  T(C): 36.4 (29 Apr 2023 13:02), Max: 36.8 (28 Apr 2023 19:42)  T(F): 97.5 (29 Apr 2023 13:02), Max: 98.3 (28 Apr 2023 19:42)  HR: 55 (29 Apr 2023 13:02) (55 - 67)  BP: 120/66 (29 Apr 2023 13:02) (120/66 - 153/77)  BP(mean): --  RR: 17 (29 Apr 2023 13:02) (16 - 18)  SpO2: 93% (29 Apr 2023 13:02) (92% - 100%)    Parameters below as of 29 Apr 2023 13:02  Patient On (Oxygen Delivery Method): room air    I&O's Summary    On Exam:  Constitutional: NAD, alert and oriented x 3  Lungs:  Non-labored, breath sounds are clear bilaterally, No wheezing, rales or rhonchi  Cardiovascular: RRR.  S1 and S2 positive.  No murmurs, rubs, gallops or clicks  Gastrointestinal: Bowel Sounds present, soft, nontender.   Lymph: No peripheral edema. No cervical lymphadenopathy.  Neurological: Alert, no focal deficits  Skin: No rashes or ulcers   Psych:  Mood & affect appropriate.    LABS: All Labs Reviewed:                        12.0   7.32  )-----------( 205      ( 29 Apr 2023 06:30 )             36.8                         12.3   8.61  )-----------( 239      ( 28 Apr 2023 12:10 )             37.9     29 Apr 2023 06:30    135    |  101    |  12     ----------------------------<  104    4.4     |  29     |  0.79   28 Apr 2023 12:10    133    |  100    |  12     ----------------------------<  131    4.7     |  27     |  0.85     Ca    9.0        29 Apr 2023 06:30  Ca    8.8        28 Apr 2023 12:10    TPro  7.7    /  Alb  3.5    /  TBili  0.7    /  DBili  x      /  AST  22     /  ALT  17     /  AlkPhos  76     28 Apr 2023 12:10    RADIOLOGY:    EKG:    Assessment/Plan:    Marissa Felix DNP, NP-C, AGACNP-C  Cardiology  Spectra #[id number]         [facility] - Antoni Santana, Mark Lea Savella, [place]  Office Number: [phone]    Initial Consult Note: 83 y/o M w/ PMHx of arthritis, BPH, HTN, hyperuricemia p/w dark stool that began yesterday (4/28).  Found to be FOBT+.  Seen by GI, for possible EGD on Monday.    CHIEF COMPLAINT: Patient is a 82y old  Male who presents with a chief complaint of GI Bleed (29 Apr 2023 10:00)    HPI:  83 y/o M w/ PMHx of arthritis, BPH, HTN, hyperuricemia p/w dark stool that began yesterday (4/28). Wife present at bedside and provided additional history. Patient states that he had 3 BMs which were all dark stools and 1 BM today. He states that he normally has around 3-4 BMs daily and also tends to have dark stools when eating spinach; stools also tend to lighten up in color and resolve on their own. However, patient became concerned when the color of his stools remained the same and he has not eaten spinach recently. Endorses fatigue from not eating much today, but otherwise feels well and denies all other symptoms.    In the ED  Vitals: 150/70, HR 68, RR 18, T 98   Labs: FOBT +, Hb 12.3, Na 133,   Imaging  IMPRESSION:  No scan evidence of an active GI bleeding site at the time of this   examination.  Colonic diverticulosis without scan evidence of acute diverticulitis.    Meds   40mg Protonix x1  (28 Apr 2023 16:54)    PAST MEDICAL & SURGICAL HISTORY:  HTN (hypertension)  Hyperuricemia  History of BPH  H/O arthritis  knee  History of surgery  total knee  History of surgery  knee    SOCIAL HISTORY:  No tobacco, ethanol, or drug abuse.  FAMILY HISTORY:  FHx: diabetes mellitus (Father)    No family history of acute MI or sudden cardiac death.  MEDICATIONS  (STANDING):  furosemide    Tablet 20 milliGRAM(s) Oral daily  metoprolol succinate ER 50 milliGRAM(s) Oral daily  pantoprazole  Injectable 40 milliGRAM(s) IV Push two times a day  tamsulosin 0.4 milliGRAM(s) Oral at bedtime    MEDICATIONS  (PRN):  acetaminophen     Tablet .. 650 milliGRAM(s) Oral every 6 hours PRN Temp greater or equal to 38C (100.4F), Mild Pain (1 - 3)  aluminum hydroxide/magnesium hydroxide/simethicone Suspension 30 milliLiter(s) Oral every 4 hours PRN Dyspepsia  melatonin 3 milliGRAM(s) Oral at bedtime PRN Insomnia  ondansetron Injectable 4 milliGRAM(s) IV Push every 8 hours PRN Nausea and/or Vomiting    Allergies    No Known Allergies    Intolerances    REVIEW OF SYSTEMS:  CONSTITUTIONAL: No weakness, fevers or chills  EYES/ENT: No visual changes;  No vertigo or throat pain   NECK: No pain or stiffness  RESPIRATORY: No cough, wheezing, hemoptysis; No shortness of breath  CARDIOVASCULAR: No chest pain or palpitations  GASTROINTESTINAL: No abdominal pain. No nausea, vomiting, or hematemesis; No diarrhea or constipation. No melena or hematochezia.  GENITOURINARY: No dysuria, frequency or hematuria  NEUROLOGICAL: No numbness or weakness  SKIN: No itching or rash  All other review of systems is negative unless indicated above  VITAL SIGNS:   Vital Signs Last 24 Hrs  T(C): 36.4 (29 Apr 2023 13:02), Max: 36.8 (28 Apr 2023 19:42)  T(F): 97.5 (29 Apr 2023 13:02), Max: 98.3 (28 Apr 2023 19:42)  HR: 55 (29 Apr 2023 13:02) (55 - 67)  BP: 120/66 (29 Apr 2023 13:02) (120/66 - 153/77)  BP(mean): --  RR: 17 (29 Apr 2023 13:02) (16 - 18)  SpO2: 93% (29 Apr 2023 13:02) (92% - 100%)    Parameters below as of 29 Apr 2023 13:02  Patient On (Oxygen Delivery Method): room air    I&O's Summary    On Exam:  Constitutional: NAD, alert and oriented x 3  Lungs:  Non-labored, breath sounds are clear bilaterally, No wheezing, rales or rhonchi  Cardiovascular: RRR.  S1 and S2 positive.  No murmurs, rubs, gallops or clicks  Gastrointestinal: Bowel Sounds present, soft, nontender.   Lymph: No peripheral edema. No cervical lymphadenopathy.  Neurological: Alert, no focal deficits  Skin: No rashes or ulcers   Psych:  Mood & affect appropriate.    LABS: All Labs Reviewed:                        12.0   7.32  )-----------( 205      ( 29 Apr 2023 06:30 )             36.8                         12.3   8.61  )-----------( 239      ( 28 Apr 2023 12:10 )             37.9     29 Apr 2023 06:30    135    |  101    |  12     ----------------------------<  104    4.4     |  29     |  0.79   28 Apr 2023 12:10    133    |  100    |  12     ----------------------------<  131    4.7     |  27     |  0.85     Ca    9.0        29 Apr 2023 06:30  Ca    8.8        28 Apr 2023 12:10    TPro  7.7    /  Alb  3.5    /  TBili  0.7    /  DBili  x      /  AST  22     /  ALT  17     /  AlkPhos  76     28 Apr 2023 12:10    RADIOLOGY:    EKG: sinus yovani, 54, with no ischemia       [facility] - Antoni Santana, Mark Lea Savella, [place]  Office Number: [phone]    Initial Consult Note: 83 y/o M w/ PMHx of arthritis, BPH, HTN, hyperuricemia p/w dark stool that began yesterday (4/28).  Found to be FOBT+.  Seen by GI, for possible EGD on Monday.    CHIEF COMPLAINT: Patient is a 82y old  Male who presents with a chief complaint of GI Bleed (29 Apr 2023 10:00)    HPI:  83 y/o M w/ PMHx of arthritis, BPH, HTN, hyperuricemia p/w dark stool that began yesterday (4/28). Wife present at bedside and provided additional history. Patient states that he had 3 BMs which were all dark stools and 1 BM today. He states that he normally has around 3-4 BMs daily and also tends to have dark stools when eating spinach; stools also tend to lighten up in color and resolve on their own. However, patient became concerned when the color of his stools remained the same and he has not eaten spinach recently. Endorses fatigue from not eating much today, but otherwise feels well and denies all other symptoms.    In the ED  Vitals: 150/70, HR 68, RR 18, T 98   Labs: FOBT +, Hb 12.3, Na 133,   Imaging  IMPRESSION:  No scan evidence of an active GI bleeding site at the time of this   examination.  Colonic diverticulosis without scan evidence of acute diverticulitis.    Meds   40mg Protonix x1  (28 Apr 2023 16:54)    PAST MEDICAL & SURGICAL HISTORY:  HTN (hypertension)  Hyperuricemia  History of BPH  H/O arthritis  knee  History of surgery  total knee  History of surgery  knee    SOCIAL HISTORY:  No tobacco, ethanol, or drug abuse.  FAMILY HISTORY:  FHx: diabetes mellitus (Father)    No family history of acute MI or sudden cardiac death.  MEDICATIONS  (STANDING):  furosemide    Tablet 20 milliGRAM(s) Oral daily  metoprolol succinate ER 50 milliGRAM(s) Oral daily  pantoprazole  Injectable 40 milliGRAM(s) IV Push two times a day  tamsulosin 0.4 milliGRAM(s) Oral at bedtime    MEDICATIONS  (PRN):  acetaminophen     Tablet .. 650 milliGRAM(s) Oral every 6 hours PRN Temp greater or equal to 38C (100.4F), Mild Pain (1 - 3)  aluminum hydroxide/magnesium hydroxide/simethicone Suspension 30 milliLiter(s) Oral every 4 hours PRN Dyspepsia  melatonin 3 milliGRAM(s) Oral at bedtime PRN Insomnia  ondansetron Injectable 4 milliGRAM(s) IV Push every 8 hours PRN Nausea and/or Vomiting    Allergies    No Known Allergies    Intolerances    REVIEW OF SYSTEMS:  CONSTITUTIONAL: No weakness, fevers or chills  EYES/ENT: No visual changes;  No vertigo or throat pain   NECK: No pain or stiffness  RESPIRATORY: No cough, wheezing, hemoptysis; No shortness of breath  CARDIOVASCULAR: No chest pain or palpitations  GASTROINTESTINAL: No abdominal pain. No nausea, vomiting, or hematemesis; No diarrhea or constipation. No melena or hematochezia.  GENITOURINARY: No dysuria, frequency or hematuria  NEUROLOGICAL: No numbness or weakness  SKIN: No itching or rash  All other review of systems is negative unless indicated above  VITAL SIGNS:   Vital Signs Last 24 Hrs  T(C): 36.4 (29 Apr 2023 13:02), Max: 36.8 (28 Apr 2023 19:42)  T(F): 97.5 (29 Apr 2023 13:02), Max: 98.3 (28 Apr 2023 19:42)  HR: 55 (29 Apr 2023 13:02) (55 - 67)  BP: 120/66 (29 Apr 2023 13:02) (120/66 - 153/77)  BP(mean): --  RR: 17 (29 Apr 2023 13:02) (16 - 18)  SpO2: 93% (29 Apr 2023 13:02) (92% - 100%)    Parameters below as of 29 Apr 2023 13:02  Patient On (Oxygen Delivery Method): room air    I&O's Summary    On Exam:  Constitutional: NAD, alert and oriented x 3  HEENT MMM anicteric   Lungs:  Non-labored, breath sounds are clear bilaterally, No wheezing, rales or rhonchi  Cardiovascular: RRR.  S1 and S2 positive.  No murmurs, rubs, gallops or clicks  Gastrointestinal: Bowel Sounds present, soft, nontender.   Lymph: No peripheral edema. No cervical lymphadenopathy.  Neurological: Alert, no focal deficits  Skin: No rashes or ulcers   Psych:  Mood & affect appropriate.    LABS: All Labs Reviewed:                        12.0   7.32  )-----------( 205      ( 29 Apr 2023 06:30 )             36.8                         12.3   8.61  )-----------( 239      ( 28 Apr 2023 12:10 )             37.9     29 Apr 2023 06:30    135    |  101    |  12     ----------------------------<  104    4.4     |  29     |  0.79   28 Apr 2023 12:10    133    |  100    |  12     ----------------------------<  131    4.7     |  27     |  0.85     Ca    9.0        29 Apr 2023 06:30  Ca    8.8        28 Apr 2023 12:10    TPro  7.7    /  Alb  3.5    /  TBili  0.7    /  DBili  x      /  AST  22     /  ALT  17     /  AlkPhos  76     28 Apr 2023 12:10    RADIOLOGY:    EKG: sinus yovani, 54, with no ischemia

## 2023-04-29 NOTE — CONSULT NOTE ADULT - ASSESSMENT
Assessment/Plan:   83 y/o M w/ PMHx of arthritis, BPH, HTN, hyperuricemia p/w dark stool that began yesterday (4/28).  Found to be FOBT+.  Seen by GI, for possible EGD on Monday.  Denies dizziness, lightheadedness, syncope, CP, palpitation, SOB, JOYNER, or orthopnea.  States, had a BM today with normal color    GIB/Cardiac Optimization/HTN  - Had dark stools that started yesterday, which resolved today per patient   - +FOBT with no significant difference from his outpatient CBC in January, 2023 =12.9  - Hgb here 12.3 --> 12.  Continue to monitor  - GI following.  Plan for possible EGD on Monday    - He has no known cardiac disease, no evidence of ischemia, volume overload, cardiac murmur, or significant arrhythmia.  He had a normal Stress Test and TTE in 6/2022 with Dr. Burnett ([facility]).  Results seen on patient's portal  - He is considered optimized from cardiac standpoint to undergo a low risk non-cardiac procedure    - BP stable and controlled  - Monitor electrolytes to keep K>2 and Mag>2  - DVT ppx: SCD's  - Will continue to follow    Marissa Felix DNP, NP-C, AGACNP-C  Cardiology  Spectra #[id number]

## 2023-04-29 NOTE — PROGRESS NOTE ADULT - ASSESSMENT
Melena  Anemia  FOBT+    Admit to medicine  Monitor cbc  Transfuse prn  PPI BID  Possible egd Monday   Will follow      Advanced care planning was discussed with patient and family.  Advanced care planning forms were reviewed and discussed.  Risks, benefits and alternatives of gastroenterologic procedures were discussed in detail and all questions were answered.    30 minutes spent.

## 2023-04-29 NOTE — CONSULT NOTE ADULT - ASSESSMENT
81 y/o M w/ PMHx of arthritis, BPH, HTN, hyperuricemia p/w dark stool that began yesterday (4/28). Wife present at bedside and provided additional history. Patient states that he had 3 BMs which were all dark stools and 1 BM today. He states that he normally has around 3-4 BMs daily and also tends to have dark stools when eating spinach; stools also tend to lighten up in color and resolve on their own.    GI Bleed Eval  HTN  BPH  Hyperuricemia  Hiatal hernia  GERD    GI eval  trend HGB  PPI  CT abd noted -   on RA  monitor VS and HD  I ashley  At risk for AMY - eval as outpatient  out of bed  check Sat at rest and on exertion on RA  will follow

## 2023-04-29 NOTE — PROGRESS NOTE ADULT - SUBJECTIVE AND OBJECTIVE BOX
[facility]  Mat Fernandez PA-C  [address] [place], [place] [id number] [phone]          INTERVAL HPI/OVERNIGHT EVENTS:  Pt seen and examined               MEDICATIONS  (STANDING):  furosemide    Tablet 20 milliGRAM(s) Oral daily  metoprolol succinate ER 50 milliGRAM(s) Oral daily  pantoprazole  Injectable 40 milliGRAM(s) IV Push two times a day  tamsulosin 0.4 milliGRAM(s) Oral at bedtime    MEDICATIONS  (PRN):  acetaminophen     Tablet .. 650 milliGRAM(s) Oral every 6 hours PRN Temp greater or equal to 38C (100.4F), Mild Pain (1 - 3)  aluminum hydroxide/magnesium hydroxide/simethicone Suspension 30 milliLiter(s) Oral every 4 hours PRN Dyspepsia  melatonin 3 milliGRAM(s) Oral at bedtime PRN Insomnia  ondansetron Injectable 4 milliGRAM(s) IV Push every 8 hours PRN Nausea and/or Vomiting      Allergies  No Known Allergies    Intolerances      Vital Signs Last 24 Hrs  T(C): 36.4 (29 Apr 2023 13:02), Max: 36.8 (28 Apr 2023 19:42)  T(F): 97.5 (29 Apr 2023 13:02), Max: 98.3 (28 Apr 2023 19:42)  HR: 55 (29 Apr 2023 13:02) (55 - 67)  BP: 120/66 (29 Apr 2023 13:02) (120/66 - 153/77)  BP(mean): --  RR: 17 (29 Apr 2023 13:02) (16 - 18)  SpO2: 93% (29 Apr 2023 13:02) (92% - 100%)    Parameters below as of 29 Apr 2023 13:02  Patient On (Oxygen Delivery Method): room air    GENERAL:  Appears stated age  HEENT:  NC/AT,  sclera non-icteric  CHEST:  Full & symmetric excursion, no increased effort, breath sounds clear  HEART: No tachycardia  ABDOMEN:  Soft, non-tender, non-distended  EXTEREMITIES: no edema  SKIN:  No rash  NEURO:  Alert              LABS:                        12.0   7.32  )-----------( 205      ( 29 Apr 2023 06:30 )             36.8     04-29    135  |  101  |  12  ----------------------------<  104<H>  4.4   |  29  |  0.79    Ca    9.0      29 Apr 2023 06:30    TPro  7.7  /  Alb  3.5  /  TBili  0.7  /  DBili  x   /  AST  22  /  ALT  17  /  AlkPhos  76  04-28                         [facility]  Mat Fernandez PA-C  [address] [place] [address]  [phone]          INTERVAL HPI/OVERNIGHT EVENTS:  Pt seen and examined   Tolerating diet   Reports 2 BM no blood as per pt looked normal       MEDICATIONS  (STANDING):  furosemide    Tablet 20 milliGRAM(s) Oral daily  metoprolol succinate ER 50 milliGRAM(s) Oral daily  pantoprazole  Injectable 40 milliGRAM(s) IV Push two times a day  tamsulosin 0.4 milliGRAM(s) Oral at bedtime    MEDICATIONS  (PRN):  acetaminophen     Tablet .. 650 milliGRAM(s) Oral every 6 hours PRN Temp greater or equal to 38C (100.4F), Mild Pain (1 - 3)  aluminum hydroxide/magnesium hydroxide/simethicone Suspension 30 milliLiter(s) Oral every 4 hours PRN Dyspepsia  melatonin 3 milliGRAM(s) Oral at bedtime PRN Insomnia  ondansetron Injectable 4 milliGRAM(s) IV Push every 8 hours PRN Nausea and/or Vomiting      Allergies  No Known Allergies    Intolerances      REVIEW OF SYSTEMS:  CONSTITUTIONAL: No fever or chills  HEENT:  No headache, no sore throat  RESPIRATORY: No cough, wheezing, or shortness of breath  CARDIOVASCULAR: No chest pain, palpitations, or leg swelling  GASTROINTESTINAL: No abd pain, nausea, vomiting, or diarrhea  GENITOURINARY: No dysuria, frequency, or hematuria  NEUROLOGICAL: no focal weakness or dizziness  MUSCULOSKELETAL: no myalgias       Vital Signs Last 24 Hrs  T(C): 36.4 (29 Apr 2023 13:02), Max: 36.8 (28 Apr 2023 19:42)  T(F): 97.5 (29 Apr 2023 13:02), Max: 98.3 (28 Apr 2023 19:42)  HR: 55 (29 Apr 2023 13:02) (55 - 67)  BP: 120/66 (29 Apr 2023 13:02) (120/66 - 153/77)  BP(mean): --  RR: 17 (29 Apr 2023 13:02) (16 - 18)  SpO2: 93% (29 Apr 2023 13:02) (92% - 100%)    Parameters below as of 29 Apr 2023 13:02  Patient On (Oxygen Delivery Method): room air    GENERAL:  Appears stated age  HEENT:  NC/AT,  sclera non-icteric  CHEST:  Full & symmetric excursion, no increased effort, breath sounds clear  HEART: No tachycardia  ABDOMEN:  Soft, non-tender, non-distended  EXTEREMITIES: no edema  SKIN:  No rash  NEURO:  Alert              LABS:                        12.0   7.32  )-----------( 205      ( 29 Apr 2023 06:30 )             36.8     04-29    135  |  101  |  12  ----------------------------<  104<H>  4.4   |  29  |  0.79    Ca    9.0      29 Apr 2023 06:30    TPro  7.7  /  Alb  3.5  /  TBili  0.7  /  DBili  x   /  AST  22  /  ALT  17  /  AlkPhos  76  04-28

## 2023-04-29 NOTE — CONSULT NOTE ADULT - SUBJECTIVE AND OBJECTIVE BOX
Date/Time Patient Seen:  		  Referring MD:   Data Reviewed	       Patient is a 82y old  Male who presents with a chief complaint of GI Bleed (29 Apr 2023 08:12)      Subjective/HPI  vs noted  labs reviewed  imaging reviewed  ct reviewed  h and p reviewed  er provider note reviewed  alert  verbal  oriented  non smoker  non drinker     History of Present Illness:   81 y/o M w/ PMHx of arthritis, BPH, HTN, hyperuricemia p/w dark stool that began yesterday (4/28). Wife present at bedside and provided additional history. Patient states that he had 3 BMs which were all dark stools and 1 BM today. He states that he normally has around 3-4 BMs daily and also tends to have dark stools when eating spinach; stools also tend to lighten up in color and resolve on their own. However, patient became concerned when the color of his stools remained the same and he has not eaten spinach recently. Endorses fatigue from not eating much today, but otherwise feels well and denies all other symptoms.    PAST MEDICAL & SURGICAL HISTORY:  No pertinent past medical history  htn    HTN (hypertension)    Hyperuricemia    History of BPH    H/O arthritis  knee    H/O total knee replacement, right    H/O total knee replacement, left    History of total right knee replacement    History of surgery  total knee    History of surgery  knee    PAST SURGICAL HISTORY:  History of surgery total knee    History of surgery knee.     FAMILY HISTORY:  Father  Still living? Unknown  FHx: diabetes mellitus, Age at diagnosis: Age Unknown.     Social History:  · Substance use	No  · Social History (marital status, living situation, occupation, and sexual history)	Tobacco: Former smoker of 20 years, 1 PPD. Quit 30 years ago  EtOH: Former  Recreational drug use: Denies  Lives with: Wife at home  Ambulates: unassisted   ADLs: able to cook, clean, and shop independently     Tobacco Screening:  · Core Measure Site	Yes  · Has the patient used tobacco in the past 30 days?	No    Risk Assessment:    Present on Admission:  Deep Venous Thrombosis	no  Pulmonary Embolus	no     HIV Screening:  · In accordance with [place] State law, we offer every patient who comes to our ED an HIV test. Would you like to be tested today?	Opt out        Medication list         MEDICATIONS  (STANDING):  furosemide    Tablet 20 milliGRAM(s) Oral daily  metoprolol succinate ER 50 milliGRAM(s) Oral daily  pantoprazole  Injectable 40 milliGRAM(s) IV Push two times a day  tamsulosin 0.4 milliGRAM(s) Oral at bedtime    MEDICATIONS  (PRN):  acetaminophen     Tablet .. 650 milliGRAM(s) Oral every 6 hours PRN Temp greater or equal to 38C (100.4F), Mild Pain (1 - 3)  aluminum hydroxide/magnesium hydroxide/simethicone Suspension 30 milliLiter(s) Oral every 4 hours PRN Dyspepsia  melatonin 3 milliGRAM(s) Oral at bedtime PRN Insomnia  ondansetron Injectable 4 milliGRAM(s) IV Push every 8 hours PRN Nausea and/or Vomiting         Vitals log        ICU Vital Signs Last 24 Hrs  T(C): 36.4 (29 Apr 2023 04:52), Max: 36.8 (28 Apr 2023 19:42)  T(F): 97.6 (29 Apr 2023 04:52), Max: 98.3 (28 Apr 2023 19:42)  HR: 66 (29 Apr 2023 04:52) (55 - 68)  BP: 126/75 (29 Apr 2023 04:52) (126/75 - 153/77)  BP(mean): --  ABP: --  ABP(mean): --  RR: 17 (29 Apr 2023 04:52) (16 - 18)  SpO2: 92% (29 Apr 2023 04:52) (92% - 100%)    O2 Parameters below as of 29 Apr 2023 04:52  Patient On (Oxygen Delivery Method): room air                 Input and Output:  I&O's Detail      Lab Data                        12.0   7.32  )-----------( 205      ( 29 Apr 2023 06:30 )             36.8     04-29    135  |  101  |  12  ----------------------------<  104<H>  4.4   |  29  |  0.79    Ca    9.0      29 Apr 2023 06:30    TPro  7.7  /  Alb  3.5  /  TBili  0.7  /  DBili  x   /  AST  22  /  ALT  17  /  AlkPhos  76  04-28            Review of Systems	  weakness  sob    Objective     Physical Examination    heart s1s2  lung dc BS  head nc  obese  cn grossly int  verbal  alert  oriented      Pertinent Lab findings & Imaging      Ojeda:  NO   Adequate UO     I&O's Detail           Discussed with:     Cultures:	        Radiology        ACC: [id number] EXAM:  CT ANGIO ABD PELV (W)AW IC   ORDERED BY: KAREN CARDOZO     PROCEDURE DATE:  04/28/2023          INTERPRETATION:  CLINICAL INFORMATION: Black stool    COMPARISON: None.    CONTRAST/COMPLICATIONS:  IV Contrast: Omnipaque 350  90 cc administered   10 cc discarded  Oral Contrast: NONE  Complications: None reported at time of study completion    PROCEDURE:  CT of the Abdomen and Pelvis was performed.  Precontrast, Arterial and Delayed phases were performed.  Sagittal and coronal reformats were performed.    FINDINGS:  LOWER CHEST: Small hiatal hernia.    LIVER: Within normal limits.  BILE DUCTS: Normal caliber.  GALLBLADDER: Within normal limits.  SPLEEN: Within normal limits.  PANCREAS: Within normal limits.  ADRENALS: Within normal limits.  KIDNEYS/URETERS: Bilateral renal cortical scarring.    BLADDER: Within normal limits.  REPRODUCTIVE ORGANS: Enlarged prostate with TURP defect    BOWEL: No bowel obstruction. Colonic diverticulosis. No scan evidence of   an active bleeding site. Appendix normal  PERITONEUM: No ascites.  VESSELS: Nonaneurysmal.  RETROPERITONEUM/LYMPH NODES: No lymphadenopathy.  ABDOMINAL WALL: Within normal limits.  BONES: Degenerative changes.    IMPRESSION:  No scan evidence of an active GI bleeding site at the time of this   examination.    Colonic diverticulosis without scan evidence of acute diverticulitis.    Additional findings as discussed        --- End of Report ---            TONY OLIVEROS MD; Attending Radiologist  This document has been electronically signed. Apr 28 2023  4:11PM

## 2023-04-29 NOTE — CONSULT NOTE ADULT - NS ATTEND AMEND GEN_ALL_CORE FT
No signs of significant ischemia or volume overload. Currently no active cardiac conditions. No signs of ischemia, ADHF, clinical exam not consistent with severe stenotic valvular disease, no unstable arrhythmias noted. Therefore able to proceed with this GI procedrue without any further cardiac workup. Routine hemodynamic monitoring is suggested during the procedure.

## 2023-04-29 NOTE — PROGRESS NOTE ADULT - SUBJECTIVE AND OBJECTIVE BOX
Patient is a 82y old  Male who presents with a chief complaint of GI Bleed (28 Apr 2023 16:54)       INTERVAL HPI/OVERNIGHT EVENTS: Patient seen and examined at bedside. C/o mild sob some times. Denies chest pain, palpitation, abdominal pain     MEDICATIONS  (STANDING):  furosemide    Tablet 20 milliGRAM(s) Oral daily  metoprolol succinate ER 50 milliGRAM(s) Oral daily  pantoprazole  Injectable 40 milliGRAM(s) IV Push two times a day  tamsulosin 0.4 milliGRAM(s) Oral at bedtime    MEDICATIONS  (PRN):  acetaminophen     Tablet .. 650 milliGRAM(s) Oral every 6 hours PRN Temp greater or equal to 38C (100.4F), Mild Pain (1 - 3)  aluminum hydroxide/magnesium hydroxide/simethicone Suspension 30 milliLiter(s) Oral every 4 hours PRN Dyspepsia  melatonin 3 milliGRAM(s) Oral at bedtime PRN Insomnia  ondansetron Injectable 4 milliGRAM(s) IV Push every 8 hours PRN Nausea and/or Vomiting      Allergies    No Known Allergies    Intolerances        REVIEW OF SYSTEMS:  CONSTITUTIONAL: No fever, weight loss, or fatigue  EYES: No eye pain, visual disturbances, or discharge  ENMT:  No difficulty hearing, tinnitus, vertigo; No sinus or throat pain  NECK: No pain or stiffness  RESPIRATORY: No cough, wheezing, chills or hemoptysis, + mild, intermittent  shortness of breath  CARDIOVASCULAR: No chest pain, palpitations, dizziness, or leg swelling  GASTROINTESTINAL: No abdominal or epigastric pain. No nausea, vomiting, or hematemesis  GENITOURINARY: No dysuria, frequency, hematuria, or incontinence  NEUROLOGICAL: No headaches, memory loss, loss of strength, numbness, or tremors  SKIN: No itching, burning, rashes, or lesions   LYMPH NODES: No enlarged glands  MUSCULOSKELETAL: No joint pain or swelling; No muscle, back, or extremity pain  PSYCHIATRIC: No depression, anxiety, mood swings, or difficulty sleeping  HEME/LYMPH: No easy bruising, or bleeding gums  ALLERGY AND IMMUNOLOGIC: No hives or eczema    Vital Signs Last 24 Hrs  T(C): 36.4 (29 Apr 2023 04:52), Max: 36.8 (28 Apr 2023 19:42)  T(F): 97.6 (29 Apr 2023 04:52), Max: 98.3 (28 Apr 2023 19:42)  HR: 66 (29 Apr 2023 04:52) (55 - 68)  BP: 126/75 (29 Apr 2023 04:52) (126/75 - 153/77)  BP(mean): --  RR: 17 (29 Apr 2023 04:52) (16 - 18)  SpO2: 92% (29 Apr 2023 04:52) (92% - 100%)    Parameters below as of 29 Apr 2023 04:52  Patient On (Oxygen Delivery Method): room air        PHYSICAL EXAM:  GENERAL: NAD, well-developed  HEAD:  Atraumatic, Normocephalic  EYES: EOMI, PERRLA, conjunctiva and sclera clear  ENMT: No tonsillar erythema, exudates, or enlargement; Moist mucous membranes  NECK: Supple, No JVD, Normal thyroid  NERVOUS SYSTEM:  Alert & Oriented X3, Good concentration; Motor Strength 5/5 B/L upper and lower extremities  CHEST/LUNG: Clear to auscultation bilaterally; No rales, rhonchi, wheezing, or rubs  HEART: Regular rate and rhythm; No murmurs, rubs, or gallops  ABDOMEN: Soft, Nontender, Nondistended; Bowel sounds present  EXTREMITIES:  2+ Peripheral Pulses, No clubbing, cyanosis, or edema  LYMPH: No lymphadenopathy noted  SKIN: No rashes or lesions    LABS:                        12.0   7.32  )-----------( 205      ( 29 Apr 2023 06:30 )             36.8     29 Apr 2023 06:30    135    |  101    |  12     ----------------------------<  104    4.4     |  29     |  0.79     Ca    9.0        29 Apr 2023 06:30    TPro  7.7    /  Alb  3.5    /  TBili  0.7    /  DBili  x      /  AST  22     /  ALT  17     /  AlkPhos  76     28 Apr 2023 12:10      CAPILLARY BLOOD GLUCOSE        BLOOD CULTURE    RADIOLOGY & ADDITIONAL TESTS:    Imaging Personally Reviewed:  [ ] YES     Consultant(s) Notes Reviewed:      Care Discussed with Consultants/Other Providers:

## 2023-04-29 NOTE — PROGRESS NOTE ADULT - ASSESSMENT
83 y/o M w/ PMHx of arthritis, BPH, HTN, hyperuricemia p/w dark stool that began yesterday (4/28), admitted for GI bleed.

## 2023-04-30 LAB
ANION GAP SERPL CALC-SCNC: 5 MMOL/L — SIGNIFICANT CHANGE UP (ref 5–17)
BUN SERPL-MCNC: 16 MG/DL — SIGNIFICANT CHANGE UP (ref 7–23)
CALCIUM SERPL-MCNC: 9.1 MG/DL — SIGNIFICANT CHANGE UP (ref 8.5–10.1)
CHLORIDE SERPL-SCNC: 98 MMOL/L — SIGNIFICANT CHANGE UP (ref 96–108)
CO2 SERPL-SCNC: 31 MMOL/L — SIGNIFICANT CHANGE UP (ref 22–31)
CREAT SERPL-MCNC: 0.84 MG/DL — SIGNIFICANT CHANGE UP (ref 0.5–1.3)
EGFR: 88 ML/MIN/1.73M2 — SIGNIFICANT CHANGE UP
GLUCOSE SERPL-MCNC: 97 MG/DL — SIGNIFICANT CHANGE UP (ref 70–99)
HCT VFR BLD CALC: 37.6 % — LOW (ref 39–50)
HGB BLD-MCNC: 12.1 G/DL — LOW (ref 13–17)
MCHC RBC-ENTMCNC: 28.3 PG — SIGNIFICANT CHANGE UP (ref 27–34)
MCHC RBC-ENTMCNC: 32.2 GM/DL — SIGNIFICANT CHANGE UP (ref 32–36)
MCV RBC AUTO: 88.1 FL — SIGNIFICANT CHANGE UP (ref 80–100)
NRBC # BLD: 0 /100 WBCS — SIGNIFICANT CHANGE UP (ref 0–0)
PLATELET # BLD AUTO: 230 K/UL — SIGNIFICANT CHANGE UP (ref 150–400)
POTASSIUM SERPL-MCNC: 4.1 MMOL/L — SIGNIFICANT CHANGE UP (ref 3.5–5.3)
POTASSIUM SERPL-SCNC: 4.1 MMOL/L — SIGNIFICANT CHANGE UP (ref 3.5–5.3)
RBC # BLD: 4.27 M/UL — SIGNIFICANT CHANGE UP (ref 4.2–5.8)
RBC # FLD: 14.1 % — SIGNIFICANT CHANGE UP (ref 10.3–14.5)
SODIUM SERPL-SCNC: 134 MMOL/L — LOW (ref 135–145)
WBC # BLD: 7.54 K/UL — SIGNIFICANT CHANGE UP (ref 3.8–10.5)
WBC # FLD AUTO: 7.54 K/UL — SIGNIFICANT CHANGE UP (ref 3.8–10.5)

## 2023-04-30 PROCEDURE — 99233 SBSQ HOSP IP/OBS HIGH 50: CPT

## 2023-04-30 PROCEDURE — 99232 SBSQ HOSP IP/OBS MODERATE 35: CPT

## 2023-04-30 RX ADMIN — Medication 3 MILLIGRAM(S): at 22:01

## 2023-04-30 RX ADMIN — PANTOPRAZOLE SODIUM 40 MILLIGRAM(S): 20 TABLET, DELAYED RELEASE ORAL at 05:35

## 2023-04-30 RX ADMIN — PANTOPRAZOLE SODIUM 40 MILLIGRAM(S): 20 TABLET, DELAYED RELEASE ORAL at 17:34

## 2023-04-30 RX ADMIN — Medication 20 MILLIGRAM(S): at 05:35

## 2023-04-30 RX ADMIN — Medication 50 MILLIGRAM(S): at 05:35

## 2023-04-30 NOTE — PROGRESS NOTE ADULT - PROBLEM SELECTOR PLAN 1
Patient presenting w bloody stool x4 in last 24 hrs. FOBT positive   CTA Negative for acute bleed at this time. diverticulosis without diverticulitis   Hb stable at 12.3   Continue  IV PPI BID   Hold home ASA   GI Consult possible EGD Monday  Patient c/o Intermittent sob. On room air. Quit smoking 30 years ago.   Will need pulm and cardio clearances for planned EGD on Monday
Patient presenting w bloody stool x4 in last 24 hrs. FOBT positive   CTA Negative for acute bleed at this time. diverticulosis without diverticulitis   Hb stable at 12  Continue  IV PPI BID   Hold home ASA   GI Consult possible EGD Monday  Patient is medically optimized for the procedure

## 2023-04-30 NOTE — PROGRESS NOTE ADULT - TIME BILLING
Note written by attending. Meds, labs, vitals, chart reviewed.
Note written by attending. Meds, labs, vitals, chart reviewed. Plan d/w patient. He wants to go home, suggested d/c after EGD on Monday if all goes well. Patient agreed

## 2023-04-30 NOTE — PROGRESS NOTE ADULT - PROBLEM SELECTOR PLAN 3
Chronic  Continue home flomax, hold home finasteride while inpatient
Chronic  Continue home flomax, hold home finasteride while inpatient

## 2023-04-30 NOTE — PROGRESS NOTE ADULT - SUBJECTIVE AND OBJECTIVE BOX
[facility] -- Antoni Santana Pannella, Patel, Savella, Goodger  Office # [phone]      Follow Up:  Preop optimization    Subjective/Observations: Seen and examined.  Pt feeling well with no cp, sob or palpitations.  Eating without incident.  No N/V or abdominal pain.  NAD.       REVIEW OF SYSTEMS: All other review of systems is negative unless indicated above    PAST MEDICAL & SURGICAL HISTORY:  HTN (hypertension)      Hyperuricemia      History of BPH      H/O arthritis  knee      History of surgery  total knee      History of surgery  knee          MEDICATIONS  (STANDING):  furosemide    Tablet 20 milliGRAM(s) Oral daily  metoprolol succinate ER 50 milliGRAM(s) Oral daily  pantoprazole  Injectable 40 milliGRAM(s) IV Push two times a day  tamsulosin 0.4 milliGRAM(s) Oral at bedtime    MEDICATIONS  (PRN):  acetaminophen     Tablet .. 650 milliGRAM(s) Oral every 6 hours PRN Temp greater or equal to 38C (100.4F), Mild Pain (1 - 3)  aluminum hydroxide/magnesium hydroxide/simethicone Suspension 30 milliLiter(s) Oral every 4 hours PRN Dyspepsia  melatonin 3 milliGRAM(s) Oral at bedtime PRN Insomnia  ondansetron Injectable 4 milliGRAM(s) IV Push every 8 hours PRN Nausea and/or Vomiting      Allergies    No Known Allergies    Intolerances            Vital Signs Last 24 Hrs  T(C): 36.3 (30 Apr 2023 14:09), Max: 36.5 (30 Apr 2023 05:21)  T(F): 97.4 (30 Apr 2023 14:09), Max: 97.7 (30 Apr 2023 05:21)  HR: 63 (30 Apr 2023 14:09) (63 - 67)  BP: 126/68 (30 Apr 2023 14:09) (126/68 - 148/70)  BP(mean): --  RR: 17 (30 Apr 2023 14:09) (17 - 18)  SpO2: 93% (30 Apr 2023 14:09) (93% - 94%)    Parameters below as of 30 Apr 2023 14:09  Patient On (Oxygen Delivery Method): room air        I&O's Summary        PHYSICAL EXAM:  TELE: Not on tele  Constitutional: NAD, awake and alert, well-developed  HEENT: Moist Mucous Membranes, Anicteric  Pulmonary: Non-labored, breath sounds are clear bilaterally, No wheezing, rales or rhonchi  Cardiovascular: Regular, S1 and S2, No murmurs, rubs, gallops or clicks  Gastrointestinal: Bowel Sounds present, soft, nontender.   Lymph: No peripheral edema. No lymphadenopathy.  Skin: No visible rashes or ulcers.  Psych:  Mood & affect appropriate    LABS: All Labs Reviewed:                        12.1   7.54  )-----------( 230      ( 30 Apr 2023 05:17 )             37.6                         12.0   7.32  )-----------( 205      ( 29 Apr 2023 06:30 )             36.8                         12.3   8.61  )-----------( 239      ( 28 Apr 2023 12:10 )             37.9     30 Apr 2023 05:17    134    |  98     |  16     ----------------------------<  97     4.1     |  31     |  0.84   29 Apr 2023 06:30    135    |  101    |  12     ----------------------------<  104    4.4     |  29     |  0.79   28 Apr 2023 12:10    133    |  100    |  12     ----------------------------<  131    4.7     |  27     |  0.85     Ca    9.1        30 Apr 2023 05:17  Ca    9.0        29 Apr 2023 06:30  Ca    8.8        28 Apr 2023 12:10    TPro  7.7    /  Alb  3.5    /  TBili  0.7    /  DBili  x      /  AST  22     /  ALT  17     /  AlkPhos  76     28 Apr 2023 12:10    Ventricular Rate 54 BPM    Atrial Rate 54 BPM    P-R Interval 246 ms    QRS Duration 88 ms    Q-T Interval 422 ms    QTC Calculation(Bazett) 400 ms    P Axis 65 degrees    R Axis 63 degrees    T Axis 67 degrees    Diagnosis Line Sinus bradycardia with 1st degree AV block  Otherwise normal ECG  When compared with ECG of 30-AUG-2022 02:10,  Vent. rate has decreased BY  44 BPM  Confirmed by NOREEN ALVARADO ([id number]) on 4/29/2023 4:57:09 PM    ACC: [id number] EXAM:  CT ANGIO ABD PELV (W)AW IC   ORDERED BY: KAREN CARDOZO     PROCEDURE DATE:  04/28/2023          INTERPRETATION:  CLINICAL INFORMATION: Black stool    COMPARISON: None.    CONTRAST/COMPLICATIONS:  IV Contrast: Omnipaque 350 90 cc administered   10 cc discarded  Oral Contrast: NONE  Complications: None reported at time of study completion    PROCEDURE:  CT of the Abdomen and Pelvis was performed.  Precontrast, Arterial and Delayed phases were performed.  Sagittal and coronal reformats were performed.    FINDINGS:  LOWER CHEST: Small hiatal hernia.    LIVER: Within normal limits.  BILE DUCTS: Normal caliber.  GALLBLADDER: Within normal limits.  SPLEEN: Within normal limits.  PANCREAS: Within normal limits.  ADRENALS:Within normal limits.  KIDNEYS/URETERS: Bilateral renal cortical scarring.    BLADDER: Within normal limits.  REPRODUCTIVE ORGANS: Enlarged prostate with TURP defect    BOWEL: No bowel obstruction. Colonic diverticulosis. No scan evidence of   an active bleeding site. Appendix normal  PERITONEUM: No ascites.  VESSELS: Nonaneurysmal.  RETROPERITONEUM/LYMPH NODES: No lymphadenopathy.  ABDOMINAL WALL: Within normal limits.  BONES: Degenerative changes.    IMPRESSION:  No scan evidence of an active GI bleeding site at the time of this   examination.    Colonic diverticulosis without scan evidence of acute diverticulitis.    Additional findings as discussed        --- End of Report ---                        Yes

## 2023-04-30 NOTE — PROGRESS NOTE ADULT - ASSESSMENT
Melena  Anemia  FOBT+    Admit to medicine  Monitor cbc  Transfuse prn  PPI BID  EGD Monday if actively bleeding   Will follow      Advanced care planning was discussed with patient and family.  Advanced care planning forms were reviewed and discussed.  Risks, benefits and alternatives of gastroenterologic procedures were discussed in detail and all questions were answered.    30 minutes spent.

## 2023-04-30 NOTE — PROGRESS NOTE ADULT - SUBJECTIVE AND OBJECTIVE BOX
[facility]  Mat Fernandez PA-C  [address] [place] [address]  [phone]          INTERVAL HPI/OVERNIGHT EVENTS:  Pt seen and examined   tolerating diet   no GI complaints at this time   Labs noted     MEDICATIONS  (STANDING):  furosemide    Tablet 20 milliGRAM(s) Oral daily  metoprolol succinate ER 50 milliGRAM(s) Oral daily  pantoprazole  Injectable 40 milliGRAM(s) IV Push two times a day  tamsulosin 0.4 milliGRAM(s) Oral at bedtime    MEDICATIONS  (PRN):  acetaminophen     Tablet .. 650 milliGRAM(s) Oral every 6 hours PRN Temp greater or equal to 38C (100.4F), Mild Pain (1 - 3)  aluminum hydroxide/magnesium hydroxide/simethicone Suspension 30 milliLiter(s) Oral every 4 hours PRN Dyspepsia  melatonin 3 milliGRAM(s) Oral at bedtime PRN Insomnia  ondansetron Injectable 4 milliGRAM(s) IV Push every 8 hours PRN Nausea and/or Vomiting      Allergies  No Known Allergies    Intolerances    REVIEW OF SYSTEMS:  CONSTITUTIONAL: No fever or chills  HEENT:  No headache, no sore throat  RESPIRATORY: No cough, wheezing, or shortness of breath  CARDIOVASCULAR: No chest pain, palpitations, or leg swelling  GASTROINTESTINAL: No abd pain, nausea, vomiting, or diarrhea  GENITOURINARY: No dysuria, frequency, or hematuria  NEUROLOGICAL: no focal weakness or dizziness  MUSCULOSKELETAL: no myalgias     Vital Signs Last 24 Hrs  T(C): 36.5 (30 Apr 2023 05:21), Max: 36.5 (30 Apr 2023 05:21)  T(F): 97.7 (30 Apr 2023 05:21), Max: 97.7 (30 Apr 2023 05:21)  HR: 64 (30 Apr 2023 05:21) (55 - 67)  BP: 142/78 (30 Apr 2023 05:21) (120/66 - 148/70)  BP(mean): --  RR: 18 (30 Apr 2023 05:21) (17 - 18)  SpO2: 94% (30 Apr 2023 05:21) (93% - 94%)    Parameters below as of 30 Apr 2023 05:21  Patient On (Oxygen Delivery Method): room air      GENERAL:  Appears stated age  HEENT:  NC/AT,  sclera non-icteric  CHEST:  Full & symmetric excursion, no increased effort, breath sounds clear  HEART: No tachycardia  ABDOMEN:  Soft, non-tender, non-distended  EXTEREMITIES: no edema  SKIN:  No rash  NEURO:  Alert            LABS:                        12.1   7.54  )-----------( 230      ( 30 Apr 2023 05:17 )             37.6     04-30    134<L>  |  98  |  16  ----------------------------<  97  4.1   |  31  |  0.84    Ca    9.1      30 Apr 2023 05:17    TPro  7.7  /  Alb  3.5  /  TBili  0.7  /  DBili  x   /  AST  22  /  ALT  17  /  AlkPhos  76  04-28

## 2023-04-30 NOTE — PROGRESS NOTE ADULT - SUBJECTIVE AND OBJECTIVE BOX
Patient is a 82y old  Male who presents with a chief complaint of GI Bleed (30 Apr 2023 06:44)       INTERVAL HPI/OVERNIGHT EVENTS: Patient seen and examined at bedside. No new symptoms, complaints     MEDICATIONS  (STANDING):  furosemide    Tablet 20 milliGRAM(s) Oral daily  metoprolol succinate ER 50 milliGRAM(s) Oral daily  pantoprazole  Injectable 40 milliGRAM(s) IV Push two times a day  tamsulosin 0.4 milliGRAM(s) Oral at bedtime    MEDICATIONS  (PRN):  acetaminophen     Tablet .. 650 milliGRAM(s) Oral every 6 hours PRN Temp greater or equal to 38C (100.4F), Mild Pain (1 - 3)  aluminum hydroxide/magnesium hydroxide/simethicone Suspension 30 milliLiter(s) Oral every 4 hours PRN Dyspepsia  melatonin 3 milliGRAM(s) Oral at bedtime PRN Insomnia  ondansetron Injectable 4 milliGRAM(s) IV Push every 8 hours PRN Nausea and/or Vomiting      Allergies    No Known Allergies    Intolerances        REVIEW OF SYSTEMS:  CONSTITUTIONAL: No fever, weight loss, or fatigue  EYES: No eye pain, visual disturbances, or discharge  ENMT:  No difficulty hearing, tinnitus, vertigo; No sinus or throat pain  NECK: No pain or stiffness  RESPIRATORY: No cough, wheezing, chills or hemoptysis; No shortness of breath  CARDIOVASCULAR: No chest pain, palpitations, dizziness, or leg swelling  GASTROINTESTINAL: No abdominal or epigastric pain. No nausea, vomiting, or hematemesis; No diarrhea or constipation  GENITOURINARY: No dysuria, frequency, hematuria, or incontinence  NEUROLOGICAL: No headaches, memory loss, loss of strength, numbness, or tremors  SKIN: No itching, burning, rashes, or lesions   LYMPH NODES: No enlarged glands  ENDOCRINE: No heat or cold intolerance; No hair loss; No polydipsia or polyuria  MUSCULOSKELETAL: No joint pain or swelling; No muscle, back, or extremity pain  PSYCHIATRIC: No depression, anxiety, mood swings, or difficulty sleeping  HEME/LYMPH: No easy bruising, or bleeding gums  ALLERGY AND IMMUNOLOGIC: No hives or eczema    Vital Signs Last 24 Hrs  T(C): 36.5 (30 Apr 2023 05:21), Max: 36.5 (30 Apr 2023 05:21)  T(F): 97.7 (30 Apr 2023 05:21), Max: 97.7 (30 Apr 2023 05:21)  HR: 64 (30 Apr 2023 05:21) (55 - 67)  BP: 142/78 (30 Apr 2023 05:21) (120/66 - 148/70)  BP(mean): --  RR: 18 (30 Apr 2023 05:21) (17 - 18)  SpO2: 94% (30 Apr 2023 05:21) (93% - 94%)    Parameters below as of 30 Apr 2023 05:21  Patient On (Oxygen Delivery Method): room air        PHYSICAL EXAM:  GENERAL: NAD,  well-developed  HEAD:  Atraumatic, Normocephalic  EYES: EOMI, PERRLA, conjunctiva and sclera clear  ENMT: No tonsillar erythema, exudates, or enlargement; Moist mucous membranes  NECK: Supple, No JVD, Normal thyroid  NERVOUS SYSTEM:  Alert & Oriented X3, Good concentration; Motor Strength 5/5 B/L upper and lower extremities; DTRs 2+ intact and symmetric  CHEST/LUNG: Clear to auscultation bilaterally; No rales, rhonchi, wheezing, or rubs  HEART: Regular rate and rhythm; No murmurs, rubs, or gallops  ABDOMEN: Soft, Nontender, Nondistended; Bowel sounds present  EXTREMITIES:  2+ Peripheral Pulses, No clubbing, cyanosis, or edema  LYMPH: No lymphadenopathy noted  SKIN: No rashes or lesions    LABS:                        12.1   7.54  )-----------( 230      ( 30 Apr 2023 05:17 )             37.6     30 Apr 2023 05:17    134    |  98     |  16     ----------------------------<  97     4.1     |  31     |  0.84     Ca    9.1        30 Apr 2023 05:17        CAPILLARY BLOOD GLUCOSE        BLOOD CULTURE    RADIOLOGY & ADDITIONAL TESTS:    Imaging Personally Reviewed:  [ ] YES     Consultant(s) Notes Reviewed:      Care Discussed with Consultants/Other Providers:

## 2023-04-30 NOTE — PROGRESS NOTE ADULT - SUBJECTIVE AND OBJECTIVE BOX
Date/Time Patient Seen:  		  Referring MD:   Data Reviewed	       Patient is a 82y old  Male who presents with a chief complaint of GI Bleed (29 Apr 2023 13:50)      Subjective/HPI     PAST MEDICAL & SURGICAL HISTORY:  No pertinent past medical history  htn    HTN (hypertension)    Hyperuricemia    History of BPH    H/O arthritis  knee    H/O total knee replacement, right    H/O total knee replacement, left    History of total right knee replacement    History of surgery  total knee    History of surgery  knee          Medication list         MEDICATIONS  (STANDING):  furosemide    Tablet 20 milliGRAM(s) Oral daily  metoprolol succinate ER 50 milliGRAM(s) Oral daily  pantoprazole  Injectable 40 milliGRAM(s) IV Push two times a day  tamsulosin 0.4 milliGRAM(s) Oral at bedtime    MEDICATIONS  (PRN):  acetaminophen     Tablet .. 650 milliGRAM(s) Oral every 6 hours PRN Temp greater or equal to 38C (100.4F), Mild Pain (1 - 3)  aluminum hydroxide/magnesium hydroxide/simethicone Suspension 30 milliLiter(s) Oral every 4 hours PRN Dyspepsia  melatonin 3 milliGRAM(s) Oral at bedtime PRN Insomnia  ondansetron Injectable 4 milliGRAM(s) IV Push every 8 hours PRN Nausea and/or Vomiting         Vitals log        ICU Vital Signs Last 24 Hrs  T(C): 36.5 (30 Apr 2023 05:21), Max: 36.5 (30 Apr 2023 05:21)  T(F): 97.7 (30 Apr 2023 05:21), Max: 97.7 (30 Apr 2023 05:21)  HR: 64 (30 Apr 2023 05:21) (55 - 67)  BP: 142/78 (30 Apr 2023 05:21) (120/66 - 148/70)  BP(mean): --  ABP: --  ABP(mean): --  RR: 18 (30 Apr 2023 05:21) (17 - 18)  SpO2: 94% (30 Apr 2023 05:21) (93% - 94%)    O2 Parameters below as of 30 Apr 2023 05:21  Patient On (Oxygen Delivery Method): room air                 Input and Output:  I&O's Detail      Lab Data                        12.0   7.32  )-----------( 205      ( 29 Apr 2023 06:30 )             36.8     04-29    135  |  101  |  12  ----------------------------<  104<H>  4.4   |  29  |  0.79    Ca    9.0      29 Apr 2023 06:30    TPro  7.7  /  Alb  3.5  /  TBili  0.7  /  DBili  x   /  AST  22  /  ALT  17  /  AlkPhos  76  04-28            Review of Systems	      Objective     Physical Examination    heart s1s2  lung dec BS  head nc      Pertinent Lab findings & Imaging      Rusty:  NO   Adequate UO     I&O's Detail           Discussed with:     Cultures:	        Radiology

## 2023-04-30 NOTE — PROGRESS NOTE ADULT - ASSESSMENT
81 y/o M w/ PMHx of arthritis, BPH, HTN, hyperuricemia p/w dark stool that began yesterday (4/28). Wife present at bedside and provided additional history. Patient states that he had 3 BMs which were all dark stools and 1 BM today. He states that he normally has around 3-4 BMs daily and also tends to have dark stools when eating spinach; stools also tend to lighten up in color and resolve on their own.    GI Bleed Eval  HTN  BPH  Hyperuricemia  Hiatal hernia  GERD    cardio eval noted  VBG noted  VBG c/w prob AMY    GI eval  trend HGB  PPI  CT abd noted -   on RA  monitor VS and HD  I ashley  At risk for AMY - eval as outpatient  out of bed  check Sat at rest and on exertion on RA

## 2023-04-30 NOTE — PROGRESS NOTE ADULT - ASSESSMENT
Assessment/Plan:   81 y/o M w/ PMHx of arthritis, BPH, HTN, hyperuricemia p/w dark stool that began yesterday (4/28).  Found to be FOBT+.  Seen by GI, for possible EGD on Monday.  Denies dizziness, lightheadedness, syncope, CP, palpitation, SOB, JOYNER, or orthopnea.  States, had a BM today with normal color    GIB/Cardiac Optimization/HTN  - Had dark stools that started yesterday, which resolved today per patient   - +FOBT with no significant difference from his outpatient CBC in January, 2023 =12.9  - Hgb here 12.3 --> 12--> 12.1.  Continue to monitor  - CTA A&P - No scan evidence of active bleeding.  Colonic scan evidence of diverticulosis without evidence of diverticulitis  - GI following.  Plan for possible EGD on Monday    - He has no known cardiac disease, no evidence of ischemia, volume overload, cardiac murmur, or significant arrhythmia.  He had a normal Stress Test and TTE in 6/2022 with Dr. Burnett ([facility]).  Results seen on patient's portal  - He is considered optimized from cardiac standpoint to undergo a low risk non-cardiac procedure    - BP stable and controlled  - Monitor electrolytes to keep K>2 and Mag>2  - DVT ppx: SCD's  - Will continue to follow    PASTOR Schmidt, AGAACEP-C  Cardiology  Spectra #[phone]       Assessment/Plan:   81 y/o M w/ PMHx of arthritis, BPH, HTN, hyperuricemia p/w dark stool that began yesterday (4/28).  Found to be FOBT+.  Seen by GI, for possible EGD on Monday.  Denies dizziness, lightheadedness, syncope, CP, palpitation, SOB, JOYNER, or orthopnea.  States, had a BM today with normal color    GIB/Cardiac Optimization/HTN  - Had dark stools that started yesterday, which resolved today per patient   - +FOBT with no significant difference from his outpatient CBC in January, 2023 =12.9  - Hgb here 12.3 --> 12--> 12.1.  Continue to monitor  - CTA A&P - No scan evidence of active bleeding.  Colonic scan evidence of diverticulosis without evidence of diverticulitis  - GI following.  Plan for possible EGD on Monday  - hold lasix as hco3 rising.     - He has no known cardiac disease, no evidence of ischemia, volume overload, cardiac murmur, or significant arrhythmia.  He had a normal Stress Test and TTE in 6/2022 with Dr. Burnett ([facility]).  Results seen on patient's portal  - He is considered optimized from cardiac standpoint to undergo a low risk non-cardiac procedure    - BP stable and controlled  - Monitor electrolytes to keep K>2 and Mag>2  - DVT ppx: SCD's  - Will continue to follow    MAL SchmidtC, AGAACEP-C  Cardiology  Spectra #[phone]

## 2023-05-01 VITALS
OXYGEN SATURATION: 91 % | DIASTOLIC BLOOD PRESSURE: 73 MMHG | TEMPERATURE: 98 F | SYSTOLIC BLOOD PRESSURE: 126 MMHG | RESPIRATION RATE: 17 BRPM | HEART RATE: 62 BPM

## 2023-05-01 LAB
ANION GAP SERPL CALC-SCNC: 7 MMOL/L — SIGNIFICANT CHANGE UP (ref 5–17)
BUN SERPL-MCNC: 23 MG/DL — SIGNIFICANT CHANGE UP (ref 7–23)
CALCIUM SERPL-MCNC: 9.5 MG/DL — SIGNIFICANT CHANGE UP (ref 8.5–10.1)
CHLORIDE SERPL-SCNC: 96 MMOL/L — SIGNIFICANT CHANGE UP (ref 96–108)
CO2 SERPL-SCNC: 30 MMOL/L — SIGNIFICANT CHANGE UP (ref 22–31)
CREAT SERPL-MCNC: 0.97 MG/DL — SIGNIFICANT CHANGE UP (ref 0.5–1.3)
EGFR: 79 ML/MIN/1.73M2 — SIGNIFICANT CHANGE UP
GLUCOSE SERPL-MCNC: 108 MG/DL — HIGH (ref 70–99)
HCT VFR BLD CALC: 39.8 % — SIGNIFICANT CHANGE UP (ref 39–50)
HGB BLD-MCNC: 13.3 G/DL — SIGNIFICANT CHANGE UP (ref 13–17)
MCHC RBC-ENTMCNC: 28.7 PG — SIGNIFICANT CHANGE UP (ref 27–34)
MCHC RBC-ENTMCNC: 33.4 GM/DL — SIGNIFICANT CHANGE UP (ref 32–36)
MCV RBC AUTO: 85.8 FL — SIGNIFICANT CHANGE UP (ref 80–100)
NRBC # BLD: 0 /100 WBCS — SIGNIFICANT CHANGE UP (ref 0–0)
PLATELET # BLD AUTO: 247 K/UL — SIGNIFICANT CHANGE UP (ref 150–400)
POTASSIUM SERPL-MCNC: 4.3 MMOL/L — SIGNIFICANT CHANGE UP (ref 3.5–5.3)
POTASSIUM SERPL-SCNC: 4.3 MMOL/L — SIGNIFICANT CHANGE UP (ref 3.5–5.3)
RBC # BLD: 4.64 M/UL — SIGNIFICANT CHANGE UP (ref 4.2–5.8)
RBC # FLD: 14 % — SIGNIFICANT CHANGE UP (ref 10.3–14.5)
SODIUM SERPL-SCNC: 133 MMOL/L — LOW (ref 135–145)
WBC # BLD: 9.12 K/UL — SIGNIFICANT CHANGE UP (ref 3.8–10.5)
WBC # FLD AUTO: 9.12 K/UL — SIGNIFICANT CHANGE UP (ref 3.8–10.5)

## 2023-05-01 PROCEDURE — 80048 BASIC METABOLIC PNL TOTAL CA: CPT

## 2023-05-01 PROCEDURE — 99285 EMERGENCY DEPT VISIT HI MDM: CPT | Mod: 25

## 2023-05-01 PROCEDURE — 88305 TISSUE EXAM BY PATHOLOGIST: CPT | Mod: 26

## 2023-05-01 PROCEDURE — 99239 HOSP IP/OBS DSCHRG MGMT >30: CPT

## 2023-05-01 PROCEDURE — 86850 RBC ANTIBODY SCREEN: CPT

## 2023-05-01 PROCEDURE — 83880 ASSAY OF NATRIURETIC PEPTIDE: CPT

## 2023-05-01 PROCEDURE — 85027 COMPLETE CBC AUTOMATED: CPT

## 2023-05-01 PROCEDURE — 99232 SBSQ HOSP IP/OBS MODERATE 35: CPT

## 2023-05-01 PROCEDURE — 83036 HEMOGLOBIN GLYCOSYLATED A1C: CPT

## 2023-05-01 PROCEDURE — 80053 COMPREHEN METABOLIC PANEL: CPT

## 2023-05-01 PROCEDURE — 93005 ELECTROCARDIOGRAM TRACING: CPT

## 2023-05-01 PROCEDURE — 88312 SPECIAL STAINS GROUP 1: CPT | Mod: 26

## 2023-05-01 PROCEDURE — 80061 LIPID PANEL: CPT

## 2023-05-01 PROCEDURE — 96374 THER/PROPH/DIAG INJ IV PUSH: CPT

## 2023-05-01 PROCEDURE — 82272 OCCULT BLD FECES 1-3 TESTS: CPT

## 2023-05-01 PROCEDURE — 88312 SPECIAL STAINS GROUP 1: CPT

## 2023-05-01 PROCEDURE — 36415 COLL VENOUS BLD VENIPUNCTURE: CPT

## 2023-05-01 PROCEDURE — 85025 COMPLETE CBC W/AUTO DIFF WBC: CPT

## 2023-05-01 PROCEDURE — 86900 BLOOD TYPING SEROLOGIC ABO: CPT

## 2023-05-01 PROCEDURE — 88305 TISSUE EXAM BY PATHOLOGIST: CPT

## 2023-05-01 PROCEDURE — 74174 CTA ABD&PLVS W/CONTRAST: CPT | Mod: MG

## 2023-05-01 PROCEDURE — 82803 BLOOD GASES ANY COMBINATION: CPT

## 2023-05-01 PROCEDURE — 86901 BLOOD TYPING SEROLOGIC RH(D): CPT

## 2023-05-01 PROCEDURE — G1004: CPT

## 2023-05-01 RX ORDER — ASPIRIN/CALCIUM CARB/MAGNESIUM 324 MG
1 TABLET ORAL
Qty: 0 | Refills: 0 | DISCHARGE

## 2023-05-01 RX ORDER — PANTOPRAZOLE SODIUM 20 MG/1
40 TABLET, DELAYED RELEASE ORAL EVERY 12 HOURS
Refills: 0 | Status: DISCONTINUED | OUTPATIENT
Start: 2023-05-01 | End: 2023-05-01

## 2023-05-01 RX ORDER — ACETAMINOPHEN 500 MG
2 TABLET ORAL
Qty: 0 | Refills: 0 | DISCHARGE
Start: 2023-05-01

## 2023-05-01 RX ORDER — PANTOPRAZOLE SODIUM 20 MG/1
1 TABLET, DELAYED RELEASE ORAL
Qty: 20 | Refills: 0
Start: 2023-05-01 | End: 2023-05-10

## 2023-05-01 RX ADMIN — Medication 20 MILLIGRAM(S): at 06:10

## 2023-05-01 RX ADMIN — Medication 50 MILLIGRAM(S): at 06:11

## 2023-05-01 RX ADMIN — PANTOPRAZOLE SODIUM 40 MILLIGRAM(S): 20 TABLET, DELAYED RELEASE ORAL at 06:11

## 2023-05-01 RX ADMIN — PANTOPRAZOLE SODIUM 40 MILLIGRAM(S): 20 TABLET, DELAYED RELEASE ORAL at 17:57

## 2023-05-01 NOTE — DISCHARGE NOTE PROVIDER - CARE PROVIDER_API CALL
Cas Garza (DO)  Gastroenterology  [address] [place] [address]  Phone: [phone]  Fax: [phone]  Follow Up Time:

## 2023-05-01 NOTE — PROGRESS NOTE ADULT - ASSESSMENT
81 y/o M w/ PMHx of arthritis, BPH, HTN, hyperuricemia p/w dark stool that began yesterday (4/28). Wife present at bedside and provided additional history. Patient states that he had 3 BMs which were all dark stools and 1 BM today. He states that he normally has around 3-4 BMs daily and also tends to have dark stools when eating spinach; stools also tend to lighten up in color and resolve on their own.    GI Bleed Eval  HTN  BPH  Hyperuricemia  Hiatal hernia  GERD    cardio eval noted  VBG noted  VBG c/w prob AMY  no objections for procedure from [facility]    GI eval  trend HGB  PPI  CT abd noted -   on RA  monitor VS and HD  I ashley  At risk for AMY - eval as outpatient  out of bed  check Sat at rest and on exertion on RA

## 2023-05-01 NOTE — CARE COORDINATION ASSESSMENT. - NSCAREPROVIDERS_GEN_ALL_CORE_FT
CARE PROVIDERS:  Accepting Physician: Naga Nix  Administration: Cassia Butler  Admitting: Naga Nix  Attending: Kamla Hilliard  Case Management: Anurag Garcia  Case Management: Dory Sinclair  Consultant: Grace Fernandez  Consultant: Myla Flores  Consultant: Salvatore Morales  Consultant: Mat Markham  Consultant: Marissa Felix  Consultant: Cas Garza  Consultant: Leyda Gonzales  Consultant: Marga Broderick  Covering Team: Deandre Pretty  ED ACP: Jen Schilling ED Attending: Juan Cadena ED Nurse: Chirag Gutierrez  Nurse: Susana Anderson  Nurse: Jackie Gabriel  Nurse: Jessica Jackson  Nurse: Anastasiia Nascimento  Ordered: ADM, User  Override: Anahy Espinoza  PCA/Nursing Assistant: Jose Baez  Physical Therapy: Lora Espinosa  Primary Team: Kamla Hilliard  Primary Team: Reji Means  Registered Dietitian: Emilia Renteria  : Raine [place]

## 2023-05-01 NOTE — CARE COORDINATION ASSESSMENT. - OTHER PERTINENT DISCHARGE PLANNING INFORMATION:
Met with patient and wife at bedside to discuss the role of case management with verbalized understanding.  Needs unclear at present although anticipate none.  Patient admitted with GI bleed and is pending further medical workup.  Will continue to follow from a case management perspective.

## 2023-05-01 NOTE — CARE COORDINATION ASSESSMENT. - NSPASTMEDSURGHISTORY_GEN_ALL_CORE_FT
PAST MEDICAL & SURGICAL HISTORY:  HTN (hypertension)      H/O arthritis  knee      History of BPH      Hyperuricemia      History of surgery  knee      History of surgery  total knee

## 2023-05-01 NOTE — PROGRESS NOTE ADULT - SUBJECTIVE AND OBJECTIVE BOX
Date/Time Patient Seen:  		  Referring MD:   Data Reviewed	       Patient is a 82y old  Male who presents with a chief complaint of GI Bleed (30 Apr 2023 16:26)      Subjective/HPI     PAST MEDICAL & SURGICAL HISTORY:  No pertinent past medical history  htn    HTN (hypertension)    Hyperuricemia    History of BPH    H/O arthritis  knee    H/O total knee replacement, right    H/O total knee replacement, left    History of total right knee replacement    History of surgery  total knee    History of surgery  knee          Medication list         MEDICATIONS  (STANDING):  furosemide    Tablet 20 milliGRAM(s) Oral daily  metoprolol succinate ER 50 milliGRAM(s) Oral daily  pantoprazole  Injectable 40 milliGRAM(s) IV Push two times a day  tamsulosin 0.4 milliGRAM(s) Oral at bedtime    MEDICATIONS  (PRN):  acetaminophen     Tablet .. 650 milliGRAM(s) Oral every 6 hours PRN Temp greater or equal to 38C (100.4F), Mild Pain (1 - 3)  aluminum hydroxide/magnesium hydroxide/simethicone Suspension 30 milliLiter(s) Oral every 4 hours PRN Dyspepsia  melatonin 3 milliGRAM(s) Oral at bedtime PRN Insomnia  ondansetron Injectable 4 milliGRAM(s) IV Push every 8 hours PRN Nausea and/or Vomiting         Vitals log        ICU Vital Signs Last 24 Hrs  T(C): 36.4 (01 May 2023 05:13), Max: 36.4 (30 Apr 2023 20:07)  T(F): 97.5 (01 May 2023 05:13), Max: 97.5 (30 Apr 2023 20:07)  HR: 59 (01 May 2023 05:13) (59 - 63)  BP: 136/69 (01 May 2023 05:13) (126/68 - 136/69)  BP(mean): --  ABP: --  ABP(mean): --  RR: 17 (01 May 2023 05:13) (17 - 17)  SpO2: 92% (01 May 2023 05:13) (91% - 93%)    O2 Parameters below as of 01 May 2023 05:13  Patient On (Oxygen Delivery Method): room air                 Input and Output:  I&O's Detail      Lab Data                        12.1   7.54  )-----------( 230      ( 30 Apr 2023 05:17 )             37.6     04-30    134<L>  |  98  |  16  ----------------------------<  97  4.1   |  31  |  0.84    Ca    9.1      30 Apr 2023 05:17              Review of Systems	      Objective     Physical Examination  heart s1s2  lung dc BS  head nc        Pertinent Lab findings & Imaging      Rusty:  NO   Adequate UO     I&O's Detail           Discussed with:     Cultures:	        Radiology

## 2023-05-01 NOTE — PROGRESS NOTE ADULT - SUBJECTIVE AND OBJECTIVE BOX
s/p  upper gastrointestinal endoscopy    normal esophagus  multiple clean based gastric ulcers, biopsied  normal duodenum    rec:   proton pump inhibitor bid  reg diet  dc planning  outaptient follow up  needs repeat  upper gastrointestinal endoscopy in 8 weeks  d/w patient and family

## 2023-05-01 NOTE — PROGRESS NOTE ADULT - SUBJECTIVE AND OBJECTIVE BOX
[facility] -- Antoni Santana Pannella, Patel, Savella, Goodger  Office # [phone]    Follow Up:      Subjective/Observations:     REVIEW OF SYSTEMS: All other review of systems is negative unless indicated above  PAST MEDICAL & SURGICAL HISTORY:  HTN (hypertension)  Hyperuricemia  History of BPH  H/O arthritis  knee  History of surgery  total knee  History of surgery  knee    MEDICATIONS  (STANDING):  furosemide    Tablet 20 milliGRAM(s) Oral daily  metoprolol succinate ER 50 milliGRAM(s) Oral daily  pantoprazole  Injectable 40 milliGRAM(s) IV Push two times a day  tamsulosin 0.4 milliGRAM(s) Oral at bedtime    MEDICATIONS  (PRN):  acetaminophen     Tablet .. 650 milliGRAM(s) Oral every 6 hours PRN Temp greater or equal to 38C (100.4F), Mild Pain (1 - 3)  aluminum hydroxide/magnesium hydroxide/simethicone Suspension 30 milliLiter(s) Oral every 4 hours PRN Dyspepsia  melatonin 3 milliGRAM(s) Oral at bedtime PRN Insomnia  ondansetron Injectable 4 milliGRAM(s) IV Push every 8 hours PRN Nausea and/or Vomiting    Allergies    No Known Allergies    Intolerances      Vital Signs Last 24 Hrs  T(C): 36.4 (01 May 2023 05:13), Max: 36.4 (30 Apr 2023 20:07)  T(F): 97.5 (01 May 2023 05:13), Max: 97.5 (30 Apr 2023 20:07)  HR: 59 (01 May 2023 05:13) (59 - 63)  BP: 136/69 (01 May 2023 05:13) (126/68 - 136/69)  BP(mean): --  RR: 17 (01 May 2023 05:13) (17 - 17)  SpO2: 92% (01 May 2023 05:13) (91% - 93%)    Parameters below as of 01 May 2023 05:13  Patient On (Oxygen Delivery Method): room air      I&O's Summary      PHYSICAL EXAM:  TELE:   Constitutional: NAD, awake and alert, well-developed  HEENT: Moist Mucous Membranes, Anicteric  Pulmonary: Non-labored, breath sounds are clear bilaterally, No wheezing, rales or rhonchi  Cardiovascular: Regular, S1 and S2, No murmurs, rubs, gallops or clicks  Gastrointestinal: Bowel Sounds present, soft, nontender.   Lymph: No peripheral edema. No lymphadenopathy.  Skin: No visible rashes or ulcers.  Psych:  Mood & affect appropriate  LABS: All Labs Reviewed:                        12.1   7.54  )-----------( 230      ( 30 Apr 2023 05:17 )             37.6                         12.0   7.32  )-----------( 205      ( 29 Apr 2023 06:30 )             36.8                         12.3   8.61  )-----------( 239      ( 28 Apr 2023 12:10 )             37.9     30 Apr 2023 05:17    134    |  98     |  16     ----------------------------<  97     4.1     |  31     |  0.84   29 Apr 2023 06:30    135    |  101    |  12     ----------------------------<  104    4.4     |  29     |  0.79   28 Apr 2023 12:10    133    |  100    |  12     ----------------------------<  131    4.7     |  27     |  0.85     Ca    9.1        30 Apr 2023 05:17  Ca    9.0        29 Apr 2023 06:30  Ca    8.8        28 Apr 2023 12:10    TPro  7.7    /  Alb  3.5    /  TBili  0.7    /  DBili  x      /  AST  22     /  ALT  17     /  AlkPhos  76     28 Apr 2023 12:10        Marissa Felix DNP, NP-C, AGACNP-C  Cardiology   Spectra #[phone]      [facility] -- Antoni Santana Pannella, Patel, Savella, Goodger  Office # [phone]    Follow Up:  Cardiac Optimization    Subjective/Observations: Awake and alert.  Denies further melena.  Comfortable on RA.  Denies any form of respiratory or cardiac discomfort    REVIEW OF SYSTEMS: All other review of systems is negative unless indicated above  PAST MEDICAL & SURGICAL HISTORY:  HTN (hypertension)  Hyperuricemia  History of BPH  H/O arthritis  knee  History of surgery  total knee  History of surgery  knee    MEDICATIONS  (STANDING):  furosemide    Tablet 20 milliGRAM(s) Oral daily  metoprolol succinate ER 50 milliGRAM(s) Oral daily  pantoprazole  Injectable 40 milliGRAM(s) IV Push two times a day  tamsulosin 0.4 milliGRAM(s) Oral at bedtime    MEDICATIONS  (PRN):  acetaminophen     Tablet .. 650 milliGRAM(s) Oral every 6 hours PRN Temp greater or equal to 38C (100.4F), Mild Pain (1 - 3)  aluminum hydroxide/magnesium hydroxide/simethicone Suspension 30 milliLiter(s) Oral every 4 hours PRN Dyspepsia  melatonin 3 milliGRAM(s) Oral at bedtime PRN Insomnia  ondansetron Injectable 4 milliGRAM(s) IV Push every 8 hours PRN Nausea and/or Vomiting    Allergies    No Known Allergies    Intolerances    Vital Signs Last 24 Hrs  T(C): 36.4 (01 May 2023 05:13), Max: 36.4 (30 Apr 2023 20:07)  T(F): 97.5 (01 May 2023 05:13), Max: 97.5 (30 Apr 2023 20:07)  HR: 59 (01 May 2023 05:13) (59 - 63)  BP: 136/69 (01 May 2023 05:13) (126/68 - 136/69)  BP(mean): --  RR: 17 (01 May 2023 05:13) (17 - 17)  SpO2: 92% (01 May 2023 05:13) (91% - 93%)    Parameters below as of 01 May 2023 05:13  Patient On (Oxygen Delivery Method): room air    I&O's Summary      PHYSICAL EXAM:  TELE: Not on tele  Constitutional: NAD, awake and alert, obese  HEENT: Moist Mucous Membranes, Anicteric  Pulmonary: Non-labored, breath sounds are clear bilaterally, No wheezing, rales or rhonchi  Cardiovascular: Regular, S1 and S2, No murmurs, rubs, gallops or clicks  Gastrointestinal: Bowel Sounds present, soft, nontender.   Lymph: No peripheral edema. No lymphadenopathy.  Skin: No visible rashes or ulcers.  Psych:  Mood & affect appropriate  LABS: All Labs Reviewed:                        12.1   7.54  )-----------( 230      ( 30 Apr 2023 05:17 )             37.6                         12.0   7.32  )-----------( 205      ( 29 Apr 2023 06:30 )             36.8                         12.3   8.61  )-----------( 239      ( 28 Apr 2023 12:10 )             37.9     30 Apr 2023 05:17    134    |  98     |  16     ----------------------------<  97     4.1     |  31     |  0.84   29 Apr 2023 06:30    135    |  101    |  12     ----------------------------<  104    4.4     |  29     |  0.79   28 Apr 2023 12:10    133    |  100    |  12     ----------------------------<  131    4.7     |  27     |  0.85     Ca    9.1        30 Apr 2023 05:17  Ca    9.0        29 Apr 2023 06:30  Ca    8.8        28 Apr 2023 12:10    TPro  7.7    /  Alb  3.5    /  TBili  0.7    /  DBili  x      /  AST  22     /  ALT  17     /  AlkPhos  76     28 Apr 2023 12:10      Ventricular Rate 54 BPM    Atrial Rate 54 BPM    P-R Interval 246 ms    QRS Duration 88 ms    Q-T Interval 422 ms    QTC Calculation(Bazett) 400 ms    P Axis 65 degrees    R Axis 63 degrees    T Axis 67 degrees    Diagnosis Line Sinus bradycardia with 1st degree AV block  Otherwise normal ECG  When compared with ECG of 30-AUG-2022 02:10,  Vent. rate has decreased BY  44 BPM  Confirmed by NOREEN ALVARADO ([id number]) on 4/29/2023 4:57:09 PM

## 2023-05-01 NOTE — DISCHARGE NOTE PROVIDER - HOSPITAL COURSE
81 y/o M w/ PMHx of arthritis, BPH, HTN, hyperuricemia p/w dark stool, Melena. Admitted for GIB. GI saw patient and performed EGD That showed... Patient will be discharged home with outpatient follow up. Seen and examined the day of discharges. VSS. Wants to go home     GENERAL: NAD,  well-developed  HEAD:  Atraumatic, Normocephalic  EYES: EOMI, PERRLA, conjunctiva and sclera clear  ENMT: No tonsillar erythema, exudates, or enlargement; Moist mucous membranes  NECK: Supple, No JVD, Normal thyroid  NERVOUS SYSTEM:  Alert & Oriented X3, Good concentration; Motor Strength 5/5 B/L upper and lower extremities; DTRs 2+ intact and symmetric  CHEST/LUNG: Clear to auscultation bilaterally; No rales, rhonchi, wheezing, or rubs  HEART: Regular rate and rhythm; No murmurs, rubs, or gallops  ABDOMEN: Soft, Nontender, Nondistended; Bowel sounds present  EXTREMITIES:  2+ Peripheral Pulses, No clubbing, cyanosis, or edema  LYMPH: No lymphadenopathy noted  SKIN: No rashes or lesions    Total discharge time spent 40 minutes, including medication reconciliation, counseling and education, f/u with consultants 81 y/o M w/ PMHx of arthritis, BPH, HTN, hyperuricemia p/w dark stool, Melena. Admitted for GIB. GI saw patient and performed EGD That showed gastric ulcers. GI suggested Protonix twice a day and outpatient follow up.  Patient will be discharged home with outpatient follow up. Seen and examined the day of discharges. VSS. Wants to go home     GENERAL: NAD,  well-developed  HEAD:  Atraumatic, Normocephalic  EYES: EOMI, PERRLA, conjunctiva and sclera clear  ENMT: No tonsillar erythema, exudates, or enlargement; Moist mucous membranes  NECK: Supple, No JVD, Normal thyroid  NERVOUS SYSTEM:  Alert & Oriented X3, Good concentration; Motor Strength 5/5 B/L upper and lower extremities; DTRs 2+ intact and symmetric  CHEST/LUNG: Clear to auscultation bilaterally; No rales, rhonchi, wheezing, or rubs  HEART: Regular rate and rhythm; No murmurs, rubs, or gallops  ABDOMEN: Soft, Nontender, Nondistended; Bowel sounds present  EXTREMITIES:  2+ Peripheral Pulses, No clubbing, cyanosis, or edema  LYMPH: No lymphadenopathy noted  SKIN: No rashes or lesions    Total discharge time spent 40 minutes, including medication reconciliation, counseling and education, f/u with consultants

## 2023-05-01 NOTE — DISCHARGE NOTE NURSING/CASE MANAGEMENT/SOCIAL WORK - PATIENT PORTAL LINK FT
You can access the FollowMyHealth Patient Portal offered by [facility] by registering at the following website: [email or web address]. By joining [facility]’s FollowMyHealth portal, you will also be able to view your health information using other applications (apps) compatible with our system.

## 2023-05-01 NOTE — PROGRESS NOTE ADULT - NS ATTEND AMEND GEN_ALL_CORE FT
optimized for EGD. No signs of significant ischemia or volume overload. cont current care. Further cardiac workup will depend on clinical course.
optimized for EGD. No signs of significant ischemia or volume overload. cont current care. Further cardiac workup will depend on clinical course.

## 2023-05-01 NOTE — PROGRESS NOTE ADULT - ASSESSMENT
83 y/o M w/ PMHx of arthritis, BPH, HTN, hyperuricemia p/w dark stool that began yesterday (4/28).  Found to be FOBT+.  Seen by GI, for possible EGD on Monday.  Denies dizziness, lightheadedness, syncope, CP, palpitation, SOB, JOYNER, or orthopnea.  States, had a BM today with normal color    GIB/Cardiac Optimization/HTN  - +FOBT with no significant difference from his outpatient CBC in January, 2023 =12.9  - Hgb remains stable.  His baseline is 12.  Continue to monitor  - CTA A&P - No evidence of active bleeding.  Colonic scan evidence of diverticulosis without evidence of diverticulitis  - GI following.  Plan for possible EGD today, 5/1    - He has no known cardiac disease, no evidence of ischemia, volume overload, cardiac murmur, or significant arrhythmia.  He had a normal Stress Test and TTE in 6/2022 with Dr. Burnett ([facility]).  Results seen on patient's portal  - He is considered optimized from cardiac standpoint to undergo a low risk non-cardiac procedure    - BP stable and controlled  - Continue home Lasix and Lopressor    - Monitor electrolytes to keep K>2 and Mag>2  - DVT ppx: SCD's  - Will continue to follow    Marissa Felix DNP, NP-C, AGACNP-C  Cardiology   Spectra #[phone]

## 2023-05-01 NOTE — PROGRESS NOTE ADULT - PROVIDER SPECIALTY LIST ADULT
Gastroenterology
Pulmonology
Cardiology
Gastroenterology
Internal Medicine
Cardiology
Gastroenterology
Pulmonology
Internal Medicine

## 2023-05-01 NOTE — DISCHARGE NOTE PROVIDER - NSDCCPCAREPLAN_GEN_ALL_CORE_FT
PRINCIPAL DISCHARGE DIAGNOSIS  Diagnosis: GIB (gastrointestinal bleeding)  Assessment and Plan of Treatment: Continue Protonix  Continue home meds  f/u with GI and all your doctors     PRINCIPAL DISCHARGE DIAGNOSIS  Diagnosis: GIB (gastrointestinal bleeding)  Assessment and Plan of Treatment: Continue Protonix for gastric ulcers seen on EGD  Continue home meds. Hold aspirin until f/u with GI   f/u with GI and all your doctors

## 2023-05-01 NOTE — DISCHARGE NOTE PROVIDER - NSDCMRMEDTOKEN_GEN_ALL_CORE_FT
acetaminophen 325 mg oral tablet: 2 tab(s) orally every 6 hours As needed Temp greater or equal to 38C (100.4F), Mild Pain (1 - 3)  aluminum hydroxide-magnesium hydroxide 200 mg-200 mg/5 mL oral suspension: 30 milliliter(s) orally every 4 hours As needed Dyspepsia  Aspirin Enteric Coated 81 mg oral delayed release tablet: 1 tab(s) orally once a day  finasteride 5 mg oral tablet: 1 tab(s) orally once a day  Flomax 0.4 mg oral capsule: 1 cap(s) orally once a day  furosemide 20 mg oral tablet: 1 tab(s) orally once a day  metoprolol succinate 50 mg oral capsule, extended release: 1 orally once a day  multivitamin: 1 tab(s) orally once a day  Vitamin B12: 1 tab(s) orally once a day  Vitamin D3: 1 tab(s) orally once a day

## 2023-10-04 NOTE — ED PROVIDER NOTE - CPE EDP GASTRO NORM
pt endorsing 10/10 abdominal pain   istop [id number]  1 prn since admission  repeat ECG if QTC<475 please consider restarting Methadone patient was previously on 5mg BID to avoid sedation can consider starting Methadone at 2.5mg BID, recommend starting solution as patient endorses difficulty swallowing pills at this time  can switch oxycodone to solution for better tolerance recommending   oxycodone 2.5mg PO Solution q4 hours prn for moderate pain (hold sbp<90, RR<10, obtundation)   oxycodone 5mg PO solution q4 hours prn for severe pain (hold sbp<90, RR<10, obtundation)   if unable to tolerate PO can start   IV morphine 1mg q4 hours prn for moderate pain  (hold sbp<90, RR<10, obtundation)   IV morphine 2mg q4 hours prn for severe pain  (hold sbp<90, RR<10, obtundation)   bowel regimen while on opioids  Inpatient: Naoloxone 0.1mg IV q3 minutes PRN  for respiratory depression (RR < 11) or oversedation due to opioids. (Max - 4 doses)  Upon discharge: Naloxone 4mg/0.1 ml nasal spray, Spray 0.1mL into one nostril. Repeat with second spray into other nostril after 2-3 minutes if no or minimal response. ([email or web address]). Please be sure the patient's pharmacy has the medication, otherwise, be sure there is a pharmacy where the patient can get the Naloxone inhaled.    Please provide patient education prior to discharge. Resource: https://www.health.[place].gov/diseases/aids/general/opioid_overdose_prevention/overdose_facts.htm.
normal...

## 2024-09-14 NOTE — ED ADULT NURSE NOTE - CINV DISCH TEACH PARTICIP
Orientation to room/Bed in low position, brakes on/Side rails x 2 or 4 up, assess large gaps, such that a patient could get extremity or other body part entrapped, use additional safety procedures/Use of non-skid footwear for ambulating patients, use of appropriate size clothing to prevent risk of tripping/Assess eliminations need, assist as needed/Call light is within reach, educate patient/family on its functionality/Environment clear of unused equipment, furniture's in place, clear of hazards/Assess for adequate lighting, leave nightlight on/Patient and family education available to parents and patient/Document fall prevention teaching and include in plan of care
Patient/Spouse

## 2025-01-29 NOTE — ED ADULT NURSE NOTE - MODE OF DISCHARGE
Problem: Adult Inpatient Plan of Care  Goal: Plan of Care Review  Outcome: Progressing  Flowsheets (Taken 1/29/2025 6161)  Plan of Care Reviewed With:   patient   spouse  Goal: Readiness for Transition of Care  Outcome: Progressing     Problem: Labor  Goal: Hemostasis  Outcome: Progressing  Goal: Stable Fetal Wellbeing  Outcome: Progressing  Goal: Acceptable Pain Control  Outcome: Progressing   Reviewed plan of care and unit routine.   Ambulatory

## 2025-06-11 ENCOUNTER — INPATIENT (INPATIENT)
Facility: HOSPITAL | Age: 85
LOS: 1 days | Discharge: HOME | DRG: 392 | End: 2025-06-13
Attending: INTERNAL MEDICINE | Admitting: INTERNAL MEDICINE
Payer: MEDICARE

## 2025-06-11 VITALS
HEART RATE: 99 BPM | RESPIRATION RATE: 20 BRPM | OXYGEN SATURATION: 93 % | WEIGHT: 175.05 LBS | DIASTOLIC BLOOD PRESSURE: 77 MMHG | HEIGHT: 66 IN | SYSTOLIC BLOOD PRESSURE: 164 MMHG | TEMPERATURE: 98 F

## 2025-06-11 DIAGNOSIS — Z98.890 OTHER SPECIFIED POSTPROCEDURAL STATES: Chronic | ICD-10-CM

## 2025-06-11 LAB
ALBUMIN SERPL ELPH-MCNC: 3.6 G/DL — SIGNIFICANT CHANGE UP (ref 3.3–5)
ALP SERPL-CCNC: 111 U/L — SIGNIFICANT CHANGE UP (ref 40–120)
ALT FLD-CCNC: 17 U/L — SIGNIFICANT CHANGE UP (ref 12–78)
ANION GAP SERPL CALC-SCNC: 6 MMOL/L — SIGNIFICANT CHANGE UP (ref 5–17)
APPEARANCE UR: SIGNIFICANT CHANGE UP
APTT BLD: 32 SEC — SIGNIFICANT CHANGE UP (ref 26.1–36.8)
AST SERPL-CCNC: 19 U/L — SIGNIFICANT CHANGE UP (ref 15–37)
BASOPHILS # BLD AUTO: 0.04 K/UL — SIGNIFICANT CHANGE UP (ref 0–0.2)
BASOPHILS NFR BLD AUTO: 0.5 % — SIGNIFICANT CHANGE UP (ref 0–2)
BILIRUB SERPL-MCNC: 1 MG/DL — SIGNIFICANT CHANGE UP (ref 0.2–1.2)
BILIRUB UR-MCNC: SIGNIFICANT CHANGE UP
BUN SERPL-MCNC: 14 MG/DL — SIGNIFICANT CHANGE UP (ref 7–23)
CALCIUM SERPL-MCNC: 8.9 MG/DL — SIGNIFICANT CHANGE UP (ref 8.5–10.1)
CHLORIDE SERPL-SCNC: 99 MMOL/L — SIGNIFICANT CHANGE UP (ref 96–108)
CO2 SERPL-SCNC: 29 MMOL/L — SIGNIFICANT CHANGE UP (ref 22–31)
COLOR SPEC: ABNORMAL
CREAT SERPL-MCNC: 1.1 MG/DL — SIGNIFICANT CHANGE UP (ref 0.5–1.3)
DIFF PNL FLD: SIGNIFICANT CHANGE UP
EGFR: 67 ML/MIN/1.73M2 — SIGNIFICANT CHANGE UP
EGFR: 67 ML/MIN/1.73M2 — SIGNIFICANT CHANGE UP
EOSINOPHIL # BLD AUTO: 0.21 K/UL — SIGNIFICANT CHANGE UP (ref 0–0.5)
EOSINOPHIL NFR BLD AUTO: 2.7 % — SIGNIFICANT CHANGE UP (ref 0–6)
GLUCOSE SERPL-MCNC: 104 MG/DL — HIGH (ref 70–99)
GLUCOSE UR QL: SIGNIFICANT CHANGE UP MG/DL
HCT VFR BLD CALC: 39.6 % — SIGNIFICANT CHANGE UP (ref 39–50)
HGB BLD-MCNC: 13.4 G/DL — SIGNIFICANT CHANGE UP (ref 13–17)
IMM GRANULOCYTES NFR BLD AUTO: 0.6 % — SIGNIFICANT CHANGE UP (ref 0–0.9)
INR BLD: 1.08 RATIO — SIGNIFICANT CHANGE UP (ref 0.85–1.16)
KETONES UR QL: SIGNIFICANT CHANGE UP MG/DL
LEUKOCYTE ESTERASE UR-ACNC: SIGNIFICANT CHANGE UP
LYMPHOCYTES # BLD AUTO: 1.31 K/UL — SIGNIFICANT CHANGE UP (ref 1–3.3)
LYMPHOCYTES # BLD AUTO: 16.7 % — SIGNIFICANT CHANGE UP (ref 13–44)
MAGNESIUM SERPL-MCNC: 2.1 MG/DL — SIGNIFICANT CHANGE UP (ref 1.6–2.6)
MCHC RBC-ENTMCNC: 30.4 PG — SIGNIFICANT CHANGE UP (ref 27–34)
MCHC RBC-ENTMCNC: 33.8 G/DL — SIGNIFICANT CHANGE UP (ref 32–36)
MCV RBC AUTO: 89.8 FL — SIGNIFICANT CHANGE UP (ref 80–100)
MONOCYTES # BLD AUTO: 0.48 K/UL — SIGNIFICANT CHANGE UP (ref 0–0.9)
MONOCYTES NFR BLD AUTO: 6.1 % — SIGNIFICANT CHANGE UP (ref 2–14)
NEUTROPHILS # BLD AUTO: 5.76 K/UL — SIGNIFICANT CHANGE UP (ref 1.8–7.4)
NEUTROPHILS NFR BLD AUTO: 73.4 % — SIGNIFICANT CHANGE UP (ref 43–77)
NITRITE UR-MCNC: SIGNIFICANT CHANGE UP
NRBC BLD AUTO-RTO: 0 /100 WBCS — SIGNIFICANT CHANGE UP (ref 0–0)
PH UR: SIGNIFICANT CHANGE UP (ref 5–8)
PLATELET # BLD AUTO: 253 K/UL — SIGNIFICANT CHANGE UP (ref 150–400)
POTASSIUM SERPL-MCNC: 4.6 MMOL/L — SIGNIFICANT CHANGE UP (ref 3.5–5.3)
POTASSIUM SERPL-SCNC: 4.6 MMOL/L — SIGNIFICANT CHANGE UP (ref 3.5–5.3)
PROT SERPL-MCNC: 7.5 G/DL — SIGNIFICANT CHANGE UP (ref 6–8.3)
PROT UR-MCNC: SIGNIFICANT CHANGE UP MG/DL
PROTHROM AB SERPL-ACNC: 12.7 SEC — SIGNIFICANT CHANGE UP (ref 9.9–13.4)
RBC # BLD: 4.41 M/UL — SIGNIFICANT CHANGE UP (ref 4.2–5.8)
RBC # FLD: 14.5 % — SIGNIFICANT CHANGE UP (ref 10.3–14.5)
SODIUM SERPL-SCNC: 134 MMOL/L — LOW (ref 135–145)
SP GR SPEC: SIGNIFICANT CHANGE UP (ref 1–1.03)
UROBILINOGEN FLD QL: SIGNIFICANT CHANGE UP MG/DL (ref 0.2–1)
WBC # BLD: 7.85 K/UL — SIGNIFICANT CHANGE UP (ref 3.8–10.5)
WBC # FLD AUTO: 7.85 K/UL — SIGNIFICANT CHANGE UP (ref 3.8–10.5)

## 2025-06-11 PROCEDURE — 71045 X-RAY EXAM CHEST 1 VIEW: CPT | Mod: 26

## 2025-06-11 PROCEDURE — 74177 CT ABD & PELVIS W/CONTRAST: CPT | Mod: 26

## 2025-06-11 PROCEDURE — 99285 EMERGENCY DEPT VISIT HI MDM: CPT

## 2025-06-11 RX ORDER — CEFUROXIME SODIUM 1.5 G
1 VIAL (EA) INJECTION
Qty: 20 | Refills: 0
Start: 2025-06-11 | End: 2025-06-20

## 2025-06-11 RX ORDER — CEFTRIAXONE 500 MG/1
1000 INJECTION, POWDER, FOR SOLUTION INTRAMUSCULAR; INTRAVENOUS ONCE
Refills: 0 | Status: COMPLETED | OUTPATIENT
Start: 2025-06-11 | End: 2025-06-11

## 2025-06-11 RX ORDER — CEFUROXIME SODIUM 1.5 G
500 VIAL (EA) INJECTION ONCE
Refills: 0 | Status: COMPLETED | OUTPATIENT
Start: 2025-06-11 | End: 2025-06-11

## 2025-06-11 RX ADMIN — CEFTRIAXONE 100 MILLIGRAM(S): 500 INJECTION, POWDER, FOR SOLUTION INTRAMUSCULAR; INTRAVENOUS at 15:55

## 2025-06-11 RX ADMIN — Medication 500 MILLIGRAM(S): at 20:18

## 2025-06-11 RX ADMIN — Medication 500 MILLILITER(S): at 15:55

## 2025-06-11 NOTE — CONSULT NOTE ADULT - SUBJECTIVE AND OBJECTIVE BOX
Urology Consult    85yo Male with pmhx HTN, COPD, BPH s/p TURP in 2022 presents for 1 day history of hematuria. Pt reports 2 days ago had retention, felt as if he was not emptying fully. Today, noticed hematuria with some clots. Pt takes ASA 81 daily, no other AC use. Of note, pt had a TURP in 2022, last followed with Urologist 2023. Denies fever, chills, CP, SOB, dizziness, urinary frequency, urgency, or dysuria.     PAST MEDICAL & SURGICAL HISTORY:  HTN (hypertension)      Hyperuricemia      History of BPH      H/O arthritis  knee      History of surgery  total knee      History of surgery  knee          MEDICATIONS  (STANDING):    MEDICATIONS  (PRN):      Allergies    No Known Allergies    Intolerances        SOCIAL HISTORY: No illicit drug use    FAMILY HISTORY:  FHx: diabetes mellitus (Father)        REVIEW OF SYSTEMS   [x] A ten-point review of systems was otherwise negative except as noted.  [ ] Due to altered mental status/intubation, subjective information were not able to be obtained from patient. History was obtained, to the extent possible, from review of the chart and collateral sources of information.    Vital Signs Last 24 Hrs  T(C): 36.4 (11 Jun 2025 19:30), Max: 36.6 (11 Jun 2025 13:22)  T(F): 97.6 (11 Jun 2025 19:30), Max: 97.9 (11 Jun 2025 13:22)  HR: 99 (11 Jun 2025 19:30) (99 - 99)  BP: 185/76 (11 Jun 2025 19:30) (164/77 - 185/76)  BP(mean): --  RR: 18 (11 Jun 2025 19:30) (18 - 20)  SpO2: 92% (11 Jun 2025 19:30) (92% - 93%)    Parameters below as of 11 Jun 2025 19:30  Patient On (Oxygen Delivery Method): room air        PHYSICAL EXAM:  GEN: NAD, awake and alert.  SKIN: Good color, non diaphoretic.  RESP: Non-labored breathing. No use of accessory muscles.  CARDIO: +S1/S2  ABDO: Soft, NT/ND, no palpable bladder, no suprapubic tenderness/ + Suprapubic Tube draining clear yellow urine.  BACK: No CVAT B/L  : + Indwelling Ojeda connected to CBI in place, draining g1-g2 hematuria without clots noted.   EXT: ROGEL x 4      I&O's Summary      LABS:                        13.4   7.85  )-----------( 253      ( 11 Jun 2025 14:37 )             39.6     06-11    134[L]  |  99  |  14  ----------------------------<  104[H]  4.6   |  29  |  1.10    Ca    8.9      11 Jun 2025 14:37  Mg     2.1     06-11    TPro  7.5  /  Alb  3.6  /  TBili  1.0  /  DBili  x   /  AST  19  /  ALT  17  /  AlkPhos  111  06-11    PT/INR - ( 11 Jun 2025 14:37 )   PT: 12.7 sec;   INR: 1.08 ratio         PTT - ( 11 Jun 2025 14:37 )  PTT:32.0 sec  Urinalysis Basic - ( 11 Jun 2025 14:56 )    Color: Red / Appearance: Bloody / SG: see note / pH: x  Gluc: x / Ketone: x  / Bili: see note / Urobili: see note mg/dL   Blood: x / Protein: see note mg/dL / Nitrite: see note   Leuk Esterase: see note / RBC: Too Numerous to count /HPF / WBC 35 /HPF   Sq Epi: x / Non Sq Epi: x / Bacteria: Few /HPF

## 2025-06-11 NOTE — ED PROVIDER NOTE - PROGRESS NOTE DETAILS
CBI drainage is clear, no active bleeding no clots. Patient is requesting to go home he will follow up with OP urology

## 2025-06-11 NOTE — ED PROVIDER NOTE - PROVIDER TOKENS
PROVIDER:[TOKEN:[[id number]]],PROVIDER:[TOKEN:[[id number]]],PROVIDER:[TOKEN:[[id number]]]

## 2025-06-11 NOTE — ED PROVIDER NOTE - PATIENT PORTAL LINK FT
You can access the FollowMyHealth Patient Portal offered by [facility] by registering at the following website: [email or web address]. By joining [facility]’s FollowMyHealth portal, you will also be able to view your health information using other applications (apps) compatible with our system.

## 2025-06-11 NOTE — ED PROVIDER NOTE - NSFOLLOWUPINSTRUCTIONS_ED_ALL_ED_FT
Follow up with the urology referrals.  Strict rest   Return to the hospital for increased bleeding , clots, fever or any changes   Take Ceftin 500mg twice daily   Follow up with the urology referrals

## 2025-06-11 NOTE — ED PROVIDER NOTE - CARE PROVIDERS DIRECT ADDRESSES
[email or web address]

## 2025-06-11 NOTE — CONSULT NOTE ADULT - ASSESSMENT
83 y/o M with pmhx, HTN,COPD BPH s/p TURP 2022 presents for hematuria. Pt connected to CBI on moderate rate with g1-g2 hematuria in tubing. Pt without complaints at this time. H&H stable, VSSAF, Cr. 1.1.   -medicine admit   -titrate CBI to light pink/ clear urine   -monitor output/ color   -care per primary team   -to be followed by Dr. Abernathy   -discussed with Dr. Abernathy

## 2025-06-11 NOTE — ED PROVIDER NOTE - CLINICAL SUMMARY MEDICAL DECISION MAKING FREE TEXT BOX
84M with PMh of HTN, BPH, no AC use only takes 81 ASA who presents with hematuria and some feelings of urinary retention. patient reports since yesterday, trickling urine and it was bloody passing blood clots. denies fevers, he had similar issues 3 years ago and needed a catheter placement and got a TURPT at that time. his urologist has since retired and he has not followed up with a different urologist.    ddx: hematuria, UTI, BPH, retention    post void scan shows 300mL, urinary retention will require 3 way wheeler catheter, CBI, labs, imaging, medications, reassess. if urine completely clears up, may send home with close outpatient urology follow up on abx. if urine remains very bloody may require admission

## 2025-06-11 NOTE — ED PROVIDER NOTE - CARE PROVIDER_API CALL
Lion Abernathy  Urology  [address] [facility], [address] [place] [address]  Phone: [phone]  Fax: [phone]  Follow Up Time:     Remi Tee  Urology  [address] [place] [id number]  Phone: [phone]  Fax: [phone]  Follow Up Time:     Davonte Siegel  Urology  [address] [facility], [address] [address]  Phone: [phone]  Fax: [phone]  Follow Up Time:

## 2025-06-11 NOTE — ED ADULT TRIAGE NOTE - CHIEF COMPLAINT QUOTE
A&Ox4 ambulatory to triage with complaints of shortness of breath x 1 year hx COPD o2 sat 93% on RA. also c/o blood with urination since yesterday

## 2025-06-11 NOTE — ED PROVIDER NOTE - OBJECTIVE STATEMENT
84M with PMh of HTN, BPH, no AC use only takes 81 ASA who presents with hematuria and some feelings of urinary retention. patient reports since yesterday, trickling urine and it was bloody passing blood clots. denies fevers, he had similar issues 3 years ago and needed a catheter placement and got a TURPT at that time. his urologist has since retired and he has not followed up with a different urologist.

## 2025-06-11 NOTE — ED ADULT NURSE REASSESSMENT NOTE - NS ED NURSE REASSESS COMMENT FT1
(Shift change) Pt received from previous RN, pt at baseline, resting in stretcher, no acute distress noted at this time. Safety checks completed, Pt provided update on care, cbi being adm, pt tolerating well, no clots noted. 2,000 mL of tinged red output noted in wheeler bag, pt states no pain, pt also notes to use oxygen at home, not needed at this time per son. call bell provided to pt. will continue to monitor pt. Sarah HOUSTON
1920: Received pt from previous nurse, Ayden HOUSTON in room 14 B a/0, no respiratory distress noted even and unlabored breathing. Skin warm and  dry, + sensation, + capillary refill < 3 sec, + sensation. Iv access noted on right lateral arm, flushes with ease. Abd soft BS+ all 4 quads nontender. 3 way wheeler #20 to CBI draining strawberry colored urine. Pt denies pain.
# 20 Fr 3 waycath inserted.  Noted urine return red.  noted blood clots.  CBI initiated at this time.  Pt tolerated well

## 2025-06-11 NOTE — ED ADULT NURSE NOTE - OBJECTIVE STATEMENT
A/Ox4.  VSS/  Noted pulse ox on R/A 95%.  Relates blood in urine although continuing to void.  States voided 1 hour PTA.  Will complete PVR after urinating.

## 2025-06-12 DIAGNOSIS — R31.9 HEMATURIA, UNSPECIFIED: ICD-10-CM

## 2025-06-12 DIAGNOSIS — M10.9 GOUT, UNSPECIFIED: ICD-10-CM

## 2025-06-12 DIAGNOSIS — I10 ESSENTIAL (PRIMARY) HYPERTENSION: ICD-10-CM

## 2025-06-12 DIAGNOSIS — G47.33 OBSTRUCTIVE SLEEP APNEA (ADULT) (PEDIATRIC): ICD-10-CM

## 2025-06-12 DIAGNOSIS — Z29.9 ENCOUNTER FOR PROPHYLACTIC MEASURES, UNSPECIFIED: ICD-10-CM

## 2025-06-12 DIAGNOSIS — N40.0 BENIGN PROSTATIC HYPERPLASIA WITHOUT LOWER URINARY TRACT SYMPTOMS: ICD-10-CM

## 2025-06-12 LAB
ALBUMIN SERPL ELPH-MCNC: 3.6 G/DL — SIGNIFICANT CHANGE UP (ref 3.3–5)
ALP SERPL-CCNC: 104 U/L — SIGNIFICANT CHANGE UP (ref 40–120)
ALT FLD-CCNC: 17 U/L — SIGNIFICANT CHANGE UP (ref 12–78)
ANION GAP SERPL CALC-SCNC: 6 MMOL/L — SIGNIFICANT CHANGE UP (ref 5–17)
AST SERPL-CCNC: 21 U/L — SIGNIFICANT CHANGE UP (ref 15–37)
BASOPHILS # BLD AUTO: 0.05 K/UL — SIGNIFICANT CHANGE UP (ref 0–0.2)
BASOPHILS NFR BLD AUTO: 0.4 % — SIGNIFICANT CHANGE UP (ref 0–2)
BILIRUB SERPL-MCNC: 0.9 MG/DL — SIGNIFICANT CHANGE UP (ref 0.2–1.2)
BUN SERPL-MCNC: 14 MG/DL — SIGNIFICANT CHANGE UP (ref 7–23)
CALCIUM SERPL-MCNC: 8.9 MG/DL — SIGNIFICANT CHANGE UP (ref 8.5–10.1)
CHLORIDE SERPL-SCNC: 102 MMOL/L — SIGNIFICANT CHANGE UP (ref 96–108)
CO2 SERPL-SCNC: 28 MMOL/L — SIGNIFICANT CHANGE UP (ref 22–31)
CREAT SERPL-MCNC: 0.9 MG/DL — SIGNIFICANT CHANGE UP (ref 0.5–1.3)
CULTURE RESULTS: SIGNIFICANT CHANGE UP
EGFR: 85 ML/MIN/1.73M2 — SIGNIFICANT CHANGE UP
EGFR: 85 ML/MIN/1.73M2 — SIGNIFICANT CHANGE UP
EOSINOPHIL # BLD AUTO: 0.42 K/UL — SIGNIFICANT CHANGE UP (ref 0–0.5)
EOSINOPHIL NFR BLD AUTO: 3.7 % — SIGNIFICANT CHANGE UP (ref 0–6)
GLUCOSE SERPL-MCNC: 110 MG/DL — HIGH (ref 70–99)
HCT VFR BLD CALC: 36.9 % — LOW (ref 39–50)
HGB BLD-MCNC: 12.5 G/DL — LOW (ref 13–17)
IMM GRANULOCYTES NFR BLD AUTO: 0.3 % — SIGNIFICANT CHANGE UP (ref 0–0.9)
LYMPHOCYTES # BLD AUTO: 2.47 K/UL — SIGNIFICANT CHANGE UP (ref 1–3.3)
LYMPHOCYTES # BLD AUTO: 21.7 % — SIGNIFICANT CHANGE UP (ref 13–44)
MAGNESIUM SERPL-MCNC: 2.2 MG/DL — SIGNIFICANT CHANGE UP (ref 1.6–2.6)
MCHC RBC-ENTMCNC: 30.3 PG — SIGNIFICANT CHANGE UP (ref 27–34)
MCHC RBC-ENTMCNC: 33.9 G/DL — SIGNIFICANT CHANGE UP (ref 32–36)
MCV RBC AUTO: 89.3 FL — SIGNIFICANT CHANGE UP (ref 80–100)
MONOCYTES # BLD AUTO: 0.78 K/UL — SIGNIFICANT CHANGE UP (ref 0–0.9)
MONOCYTES NFR BLD AUTO: 6.9 % — SIGNIFICANT CHANGE UP (ref 2–14)
NEUTROPHILS # BLD AUTO: 7.63 K/UL — HIGH (ref 1.8–7.4)
NEUTROPHILS NFR BLD AUTO: 67 % — SIGNIFICANT CHANGE UP (ref 43–77)
NRBC BLD AUTO-RTO: 0 /100 WBCS — SIGNIFICANT CHANGE UP (ref 0–0)
PHOSPHATE SERPL-MCNC: 3.4 MG/DL — SIGNIFICANT CHANGE UP (ref 2.5–4.5)
PLATELET # BLD AUTO: 220 K/UL — SIGNIFICANT CHANGE UP (ref 150–400)
POTASSIUM SERPL-MCNC: 4.3 MMOL/L — SIGNIFICANT CHANGE UP (ref 3.5–5.3)
POTASSIUM SERPL-SCNC: 4.3 MMOL/L — SIGNIFICANT CHANGE UP (ref 3.5–5.3)
PROT SERPL-MCNC: 7.2 G/DL — SIGNIFICANT CHANGE UP (ref 6–8.3)
RBC # BLD: 4.13 M/UL — LOW (ref 4.2–5.8)
RBC # FLD: 14.4 % — SIGNIFICANT CHANGE UP (ref 10.3–14.5)
SODIUM SERPL-SCNC: 136 MMOL/L — SIGNIFICANT CHANGE UP (ref 135–145)
SPECIMEN SOURCE: SIGNIFICANT CHANGE UP
WBC # BLD: 11.38 K/UL — HIGH (ref 3.8–10.5)
WBC # FLD AUTO: 11.38 K/UL — HIGH (ref 3.8–10.5)

## 2025-06-12 PROCEDURE — 93010 ELECTROCARDIOGRAM REPORT: CPT

## 2025-06-12 RX ORDER — POLYETHYLENE GLYCOL 3350 17 G/17G
17 POWDER, FOR SOLUTION ORAL
Qty: 0 | Refills: 0 | DISCHARGE
Start: 2025-06-12

## 2025-06-12 RX ORDER — TIOTROPIUM BROMIDE INHALATION SPRAY 3.12 UG/1
2 SPRAY, METERED RESPIRATORY (INHALATION) DAILY
Refills: 0 | Status: DISCONTINUED | OUTPATIENT
Start: 2025-06-12 | End: 2025-06-12

## 2025-06-12 RX ORDER — SENNA 187 MG
2 TABLET ORAL AT BEDTIME
Refills: 0 | Status: DISCONTINUED | OUTPATIENT
Start: 2025-06-12 | End: 2025-06-13

## 2025-06-12 RX ORDER — MAGNESIUM HYDROXIDE 400 MG/5ML
30 SUSPENSION ORAL DAILY
Refills: 0 | Status: DISCONTINUED | OUTPATIENT
Start: 2025-06-12 | End: 2025-06-13

## 2025-06-12 RX ORDER — SENNA 187 MG
2 TABLET ORAL AT BEDTIME
Refills: 0 | Status: DISCONTINUED | OUTPATIENT
Start: 2025-06-12 | End: 2025-06-12

## 2025-06-12 RX ORDER — ALBUTEROL SULFATE 2.5 MG/3ML
2 VIAL, NEBULIZER (ML) INHALATION
Refills: 0 | DISCHARGE

## 2025-06-12 RX ORDER — FUROSEMIDE 10 MG/ML
20 INJECTION INTRAMUSCULAR; INTRAVENOUS DAILY
Refills: 0 | Status: DISCONTINUED | OUTPATIENT
Start: 2025-06-12 | End: 2025-06-13

## 2025-06-12 RX ORDER — ALBUTEROL SULFATE 2.5 MG/3ML
2 VIAL, NEBULIZER (ML) INHALATION
Refills: 0 | Status: DISCONTINUED | OUTPATIENT
Start: 2025-06-12 | End: 2025-06-13

## 2025-06-12 RX ORDER — TIOTROPIUM BROMIDE INHALATION SPRAY 3.12 UG/1
2 SPRAY, METERED RESPIRATORY (INHALATION) DAILY
Refills: 0 | Status: DISCONTINUED | OUTPATIENT
Start: 2025-06-12 | End: 2025-06-13

## 2025-06-12 RX ORDER — MELATONIN 5 MG
3 TABLET ORAL AT BEDTIME
Refills: 0 | Status: DISCONTINUED | OUTPATIENT
Start: 2025-06-12 | End: 2025-06-13

## 2025-06-12 RX ORDER — SENNA 187 MG
2 TABLET ORAL ONCE
Refills: 0 | Status: COMPLETED | OUTPATIENT
Start: 2025-06-12 | End: 2025-06-12

## 2025-06-12 RX ORDER — FUROSEMIDE 10 MG/ML
1 INJECTION INTRAMUSCULAR; INTRAVENOUS
Refills: 0 | DISCHARGE

## 2025-06-12 RX ORDER — POLYETHYLENE GLYCOL 3350 17 G/17G
17 POWDER, FOR SOLUTION ORAL DAILY
Refills: 0 | Status: DISCONTINUED | OUTPATIENT
Start: 2025-06-12 | End: 2025-06-13

## 2025-06-12 RX ORDER — ACETAMINOPHEN 500 MG/5ML
650 LIQUID (ML) ORAL EVERY 6 HOURS
Refills: 0 | Status: DISCONTINUED | OUTPATIENT
Start: 2025-06-12 | End: 2025-06-13

## 2025-06-12 RX ORDER — SENNA 187 MG
2 TABLET ORAL
Qty: 0 | Refills: 0 | DISCHARGE
Start: 2025-06-12

## 2025-06-12 RX ORDER — MAGNESIUM, ALUMINUM HYDROXIDE 200-200 MG
30 TABLET,CHEWABLE ORAL EVERY 4 HOURS
Refills: 0 | Status: DISCONTINUED | OUTPATIENT
Start: 2025-06-12 | End: 2025-06-13

## 2025-06-12 RX ADMIN — Medication 300 MILLIGRAM(S): at 12:02

## 2025-06-12 RX ADMIN — POLYETHYLENE GLYCOL 3350 17 GRAM(S): 17 POWDER, FOR SOLUTION ORAL at 12:02

## 2025-06-12 RX ADMIN — Medication 2 TABLET(S): at 06:07

## 2025-06-12 RX ADMIN — Medication 40 MILLIGRAM(S): at 06:07

## 2025-06-12 RX ADMIN — FUROSEMIDE 20 MILLIGRAM(S): 10 INJECTION INTRAMUSCULAR; INTRAVENOUS at 06:09

## 2025-06-12 NOTE — H&P ADULT - PROBLEM SELECTOR PLAN 1
-Pt presents to ED for hematuria and urinary retention. Pt reports since yesterday he has been trickling urine with blood and passing clots  -Vitals: BP: 116/69 , HR: 80 , Temp: 97.8 , RR: 20 , SpO2: 99% on RA   -Labs significant for: Na 134. CBC wnl, rest of CMP wnl   -UA: red, WBC 35, RBC too numerous, few bacteria   -Hgb stable at this time   -continue CBI, titrate to light pink/clear urine   -monitor output   -f/u AM CBC   -Urology consulted (Dr. Abernathy), f/u recs -Pt presents to ED for hematuria and urinary retention. Pt reports since yesterday he has been trickling urine with blood and passing clots  -Vitals: BP: 116/69 , HR: 80 , Temp: 97.8 , RR: 20 , SpO2: 99% on RA   -Labs significant for: Na 134. CBC wnl, rest of CMP wnl   -UA: red, WBC 35, RBC too numerous, few bacteria   -Hgb stable at this time   -continue CBI, titrate to light pink/clear urine   -monitor output   -f/u AM CBC   -hold home aspirin   -Urology consulted (Dr. Abernathy), f/u recs -Pt presents to ED for hematuria and urinary retention. Pt reports since yesterday he has been trickling urine with blood and passing clots  -Vitals: BP: 116/69 , HR: 80 , Temp: 97.8 , RR: 20 , SpO2: 99% on RA   -Labs significant for: Na 134. CBC wnl, rest of CMP wnl   -UA: red, WBC 35, RBC too numerous, few bacteria   -In ED given: Rocephin x1, Ceftin x1, NS 500mL x1  -Hgb stable at this time   -continue CBI, titrate to light pink/clear urine   -monitor output   -f/u AM CBC   -hold home aspirin   -Urology consulted (Dr. Abernathy), f/u recs -Pt presents to ED for hematuria and urinary retention.  - Pt reports since yesterday he has been trickling urine with blood and passing clots-wheeler cath-CBI started in ER  -UA: red, WBC 35, RBC too numerous, few bacteria   -In ED given: Rocephin x1, Ceftin x1, NS 500mL x1  -Hgb stable at this time   -continue CBI, titrate to light pink/clear urine   -monitor output   -f/u AM CBC   -hold home aspirin   -Urology consulted (Dr. Abernathy), f/u recs continue CBI

## 2025-06-12 NOTE — PHYSICAL THERAPY INITIAL EVALUATION ADULT - ADDITIONAL COMMENTS
Patient lives in [facility], [address] [facility].  Patient was independent in all ADLs and ambulated independently without device.

## 2025-06-12 NOTE — PROGRESS NOTE ADULT - PROBLEM SELECTOR PLAN 2
-Chronic  -continue home Lasix 20mg qd with hold parameters for systolic BP <110  -continue to monitor hemodynamics

## 2025-06-12 NOTE — H&P ADULT - RESPIRATORY
normal/clear to auscultation bilaterally/no wheezes/no rales/no rhonchi normal/clear to auscultation bilaterally/no wheezes/no rales/no rhonchi/no respiratory distress/breath sounds equal/respirations non-labored

## 2025-06-12 NOTE — DISCHARGE NOTE PROVIDER - CARE PROVIDERS DIRECT ADDRESSES
[email or web address]

## 2025-06-12 NOTE — DISCHARGE NOTE PROVIDER - NSDCMRMEDTOKEN_GEN_ALL_CORE_FT
Albuterol (Eqv-ProAir HFA) 90 mcg/inh inhalation aerosol: 2 puff(s) inhaled 2 times a day  allopurinol 300 mg oral tablet: 1 tab(s) orally once a day  aspirin 81 mg oral tablet: 1 tab(s) orally once a day  furosemide 20 mg oral tablet: 1 tab(s) orally once a day  ipratropium-albuterol 0.5 mg-2.5 mg/3 mL inhalation solution: 2 puff(s) by nebulizer 2 times a day  pantoprazole 40 mg oral delayed release tablet: 1 tab(s) orally every 12 hours  polyethylene glycol 3350 oral powder for reconstitution: 17 gram(s) orally once a day  senna leaf extract oral tablet: 2 tab(s) orally once a day (at bedtime)   Albuterol (Eqv-ProAir HFA) 90 mcg/inh inhalation aerosol: 2 puff(s) inhaled 2 times a day  allopurinol 300 mg oral tablet: 1 tab(s) orally once a day  furosemide 20 mg oral tablet: 1 tab(s) orally once a day  ipratropium-albuterol 0.5 mg-2.5 mg/3 mL inhalation solution: 2 puff(s) by nebulizer 2 times a day  pantoprazole 40 mg oral delayed release tablet: 1 tab(s) orally every 12 hours  polyethylene glycol 3350 oral powder for reconstitution: 17 gram(s) orally once a day  senna leaf extract oral tablet: 2 tab(s) orally once a day (at bedtime)   Albuterol (Eqv-ProAir HFA) 90 mcg/inh inhalation aerosol: 2 puff(s) inhaled 2 times a day  allopurinol 300 mg oral tablet: 1 tab(s) orally once a day  furosemide 20 mg oral tablet: 1 tab(s) orally once a day  pantoprazole 40 mg oral delayed release tablet: 1 tab(s) orally every 12 hours  polyethylene glycol 3350 oral powder for reconstitution: 17 gram(s) orally once a day  senna leaf extract oral tablet: 2 tab(s) orally once a day (at bedtime)   Albuterol (Eqv-ProAir HFA) 90 mcg/inh inhalation aerosol: 2 puff(s) inhaled 2 times a day  allopurinol 300 mg oral tablet: 1 tab(s) orally once a day  aspirin 81 mg oral delayed release tablet: 1 tab(s) orally once a day Start Next week On Monday  furosemide 20 mg oral tablet: 1 tab(s) orally once a day  pantoprazole 40 mg oral delayed release tablet: 1 tab(s) orally every 12 hours  polyethylene glycol 3350 oral powder for reconstitution: 17 gram(s) orally once a day  senna leaf extract oral tablet: 2 tab(s) orally once a day (at bedtime)

## 2025-06-12 NOTE — PROGRESS NOTE ADULT - PROBLEM SELECTOR PLAN 1
After straining to constipate. Catheter irrigated with a minimal amount of clot evacuated.     CBI discontinued.      If urine remains clear, catheter can be removed and patient can be discharged in PM.    He was advised that he needs to follow up for outpatient cystoscopy to rule out bladder pathology.

## 2025-06-12 NOTE — H&P ADULT - ATTENDING COMMENTS
Pt seen, examined, case & care plan d/w p residents at detail.  Consult-Dr Abernathy for CBI-Hematuria  PO diet  PT Eval  SCD  AM stan   OOB to chair with assist 84 yr old M with PMH  HTN and BPH S/P TURP  presents to ED for hematuria and urinary retention. Pt admitted for hematuria. Started on CBI   Pt seen, examined, case & care plan d/w p residents at detail.  Consult-Dr Abernathy for CBI-Hematuria  PO diet  PT Eval  SCD  AM stan   OOB to chair with assist

## 2025-06-12 NOTE — H&P ADULT - ASSESSMENT
84 yr old M with PMH  HTN and BPH presents to ED for hematuria and urinary retention. Pt admitted for hematuria.  84 yr old M with PMH  HTN and BPH S/P TURP  presents to ED for hematuria and urinary retention. Pt admitted for hematuria. Started on CBI

## 2025-06-12 NOTE — PROGRESS NOTE ADULT - SUBJECTIVE AND OBJECTIVE BOX
Patient is a 84y old  Male who presents with a chief complaint of hematuria (12 Jun 2025 07:04)    HPI:  84 yr old M with PMH  HTN, COPD (on home 2L NC), BPH, AMY on CPAP, and gout presents to ED for hematuria and urinary retention. Pt reports since yesterday he has been trickling urine with blood and passing clots. Pt states he had retention 2 days ago and felt like he wasn't emptying out fully. Pt takes ASA 81mg daily and no other AC. Pt had TURP in 2022 and last saw urology in 2023. Pt reports severe constipation for the past 3 days. Wheeler bag currently with bright red urine.   Denies fevers, chills, HA, chest pain, SOB, abdominal pain, n/v.     ED course:  Vitals: BP: 116/69 , HR: 80 , Temp: 97.8 , RR: 20 , SpO2: 99% on RA   Labs significant for: Na 134. CBC wnl, rest of CMP wnl   UA: red, WBC 35, RBC too numerous, few bacteria   EKG: pending   In ED given: Rocephin x1, Ceftin x1, NS 500mL x1    Imaging  CXR: Clear lungs with no acute cardiopulmonary abnormalities.  CT a/p: Soft tissue density filling the bladder, unclear if hematoma or tumor.   (12 Jun 2025 01:16)    INTERVAL HPI:  6/12: Pt seen and examined at bedside. Pt endorses mild pain, only when he urinates. Has not had BM. On nocturnal CPAP, unclear settings. +wheeler in place    OVERNIGHT EVENTS: None    Home Medications:  Albuterol (Eqv-ProAir HFA) 90 mcg/inh inhalation aerosol: 2 puff(s) inhaled 2 times a day (12 Jun 2025 04:25)  allopurinol 300 mg oral tablet: 1 tab(s) orally once a day (12 Jun 2025 04:13)  aspirin 81 mg oral tablet: 1 tab(s) orally once a day (12 Jun 2025 04:13)  furosemide 20 mg oral tablet: 1 tab(s) orally once a day (12 Jun 2025 04:13)  ipratropium-albuterol 0.5 mg-2.5 mg/3 mL inhalation solution: 2 puff(s) by nebulizer 2 times a day (12 Jun 2025 04:25)      MEDICATIONS  (STANDING):  albuterol    90 MICROgram(s) HFA Inhaler 2 Puff(s) Inhalation two times a day  allopurinol 300 milliGRAM(s) Oral daily  furosemide    Tablet 20 milliGRAM(s) Oral daily  pantoprazole    Tablet 40 milliGRAM(s) Oral before breakfast  polyethylene glycol 3350 17 Gram(s) Oral daily  senna 2 Tablet(s) Oral at bedtime  sorbitol 70%/mineral oil/magnesium hydroxide/glycerin Enema 120 milliLiter(s) Rectal once  tiotropium 2.5 MICROgram(s) Inhaler 2 Puff(s) Inhalation daily    MEDICATIONS  (PRN):  acetaminophen     Tablet .. 650 milliGRAM(s) Oral every 6 hours PRN Temp greater or equal to 38C (100.4F), Mild Pain (1 - 3)  aluminum hydroxide/magnesium hydroxide/simethicone Suspension 30 milliLiter(s) Oral every 4 hours PRN Dyspepsia  melatonin 3 milliGRAM(s) Oral at bedtime PRN Insomnia      Allergies    No Known Allergies    Intolerances        Social History:  Tobacco: denies  EtOH: denies  Recreational drug use: denies  Lives with: patient lives alone at home  Ambulates: independent  ADLs: independent (12 Jun 2025 01:16)      REVIEW OF SYSTEMS:  CONSTITUTIONAL: No fever, No chills, No fatigue, No myalgia, No Body ache, No Weakness  EYES: No eye pain,  No visual disturbances, No discharge, NO Redness  ENMT:  No ear pain, No nose bleed, No vertigo; No sinus pain, NO throat pain, No Congestion  NECK: No pain, No stiffness  RESPIRATORY: No cough, NO wheezing, No  hemoptysis, NO  shortness of breath  CARDIOVASCULAR: No chest pain, palpitations  GASTROINTESTINAL: No abdominal pain, NO epigastric pain. No nausea, No vomiting; No diarrhea, No constipation. [  ] BM  GENITOURINARY: No dysuria, No frequency, No urgency, No hematuria, NO incontinence  NEUROLOGICAL: No headaches, No dizziness, No numbness, No tingling, No tremors, No weakness  EXT: No Swelling, No Pain, No Edema  SKIN:  [  ] No itching, burning, rashes, or lesions   MUSCULOSKELETAL: No joint pain ,No Jt swelling; No muscle pain, No back pain, No extremity pain  PSYCHIATRIC: No depression,  No anxiety,  No mood swings ,No difficulty sleeping at night  PAIN SCALE: [  ] None  [ X ] Other- Mild pain with urination  ROS Unable to obtain due to - [  ] Dementia  [  ] Lethargy [  ] Drowsy [  ] Sedated [  ] non verbal  REST OF REVIEW Of SYSTEM - [ X ] Normal     Vital Signs Last 24 Hrs  T(C): 36.9 (12 Jun 2025 05:59), Max: 37.1 (12 Jun 2025 02:18)  T(F): 98.4 (12 Jun 2025 05:59), Max: 98.8 (12 Jun 2025 02:18)  HR: 88 (12 Jun 2025 05:59) (76 - 99)  BP: 158/72 (12 Jun 2025 05:59) (118/79 - 185/76)  BP(mean): --  RR: 22 (12 Jun 2025 05:59) (18 - 22)  SpO2: 92% (12 Jun 2025 05:59) (92% - 99%)    Parameters below as of 12 Jun 2025 05:59  Patient On (Oxygen Delivery Method): room air      Finger Stick        06-11 @ 07:01  -  06-12 @ 07:00  --------------------------------------------------------  IN: 0 mL / OUT: 2200 mL / NET: -2200 mL        PHYSICAL EXAM:  GENERAL:  [ X ] NAD , [ X ] well appearing, [  ] Agitated, [  ] Drowsy,  [  ] Lethargy, [  ] confused   HEAD:  [ X ] Normal, [  ] Other  EYES:  [ X ] EOMI, [ X ] PERRLA, [ X ] conjunctiva and sclera clear normal, [  ] Other,  [  ] Pallor,[  ] Discharge  ENMT:  [ X ] Normal, [ X ] Moist mucous membranes, [ X ] Good dentition, [ X ] No Thrush  NECK: [ X ] Supple, [ X ] No JVD, [ X ] Normal thyroid, [  ] Lymphadenopathy [  ] Other  CHEST/LUNG:  [ X ] Clear to auscultation bilaterally, [ X ] Breath Sounds equal B/L / Decrease, [  ] poor effort  [ X ] No rales, [ X ] No rhonchi  [ X ]  No wheezing,   HEART:  [ X ] Regular rate and rhythm, [  ] tachycardia, [  ] Bradycardia,  [  ] irregular  [ X ] No murmurs, No rubs, No gallops, [  ] PPM in place (Mfr:  )  ABDOMEN:  [  ] Soft, [ X ] Nontender, [ X ] Nondistended, [ X ] No mass, [ X ] Bowel sounds present, [  ] obese  : [ X ] wheeler draining light pink urine  NERVOUS SYSTEM:  [ X ] Alert & Oriented X3, [ X ] Nonfocal  [  ] Confusion  [  ] Encephalopathic [  ] Sedated [  ] Unable to assess, [  ] Dementia [  ] Other-  EXTREMITIES: [ X ] 2+ Peripheral Pulses, No clubbing, No cyanosis,  [ X ] NO edema B/L lower EXT. [  ] PVD stasis skin changes B/L Lower EXT, [  ] wound  LYMPH: No lymphadenopathy noted  SKIN:  [  ] No rashes or lesions, [  ] Pressure Ulcers, [  ] ecchymosis, [  ] Skin Tears, [  ] Other    DIET: Diet, Regular:   Lacto Veg (Accepts Milk & Milk Products) (06-12-25 @ 02:06)      LABS:                        12.5   11.38 )-----------( 220      ( 12 Jun 2025 05:16 )             36.9     12 Jun 2025 05:16    136    |  102    |  14     ----------------------------<  110    4.3     |  28     |  0.90     Ca    8.9        12 Jun 2025 05:16  Phos  3.4       12 Jun 2025 05:16  Mg     2.2       12 Jun 2025 05:16    TPro  7.2    /  Alb  3.6    /  TBili  0.9    /  DBili  x      /  AST  21     /  ALT  17     /  AlkPhos  104    12 Jun 2025 05:16    PT/INR - ( 11 Jun 2025 14:37 )   PT: 12.7 sec;   INR: 1.08 ratio         PTT - ( 11 Jun 2025 14:37 )  PTT:32.0 sec  Urinalysis Basic - ( 12 Jun 2025 05:16 )    Color: x / Appearance: x / SG: x / pH: x  Gluc: 110 mg/dL / Ketone: x  / Bili: x / Urobili: x   Blood: x / Protein: x / Nitrite: x   Leuk Esterase: x / RBC: x / WBC x   Sq Epi: x / Non Sq Epi: x / Bacteria: x      RADIOLOGY & ADDITIONAL TESTS:  < from: Xray Chest 1 View- PORTABLE-Urgent (Xray Chest 1 View- PORTABLE-Urgent .) (06.11.25 @ 15:06) >  IMPRESSION: Clear lungs with no acute cardiopulmonary abnormalities.    < end of copied text >  < from: CT Abdomen and Pelvis w/ IV Cont (06.11.25 @ 14:55) >  IMPRESSION:  Soft tissue density filling the bladder, unclear if hematoma or tumor.    < end of copied text >        HEALTH ISSUES - PROBLEM Dx:  Hematuria    HTN (hypertension)    BPH (benign prostatic hyperplasia)    Need for prophylactic measure    History of COPD    AMY (obstructive sleep apnea)    Gout            Consultant(s) Notes Reviewed:  [ X ] YES     Care Discussed with [X] Consultants  [ X ] Patient  [  ] Family [  ] HCP [  ]   [  ] Social Service  [  ] RN, [  ] Physical Therapy,[  ] Palliative care team  DVT PPX: [  ] Lovenox, [  ] S C Heparin, [  ] Coumadin, [  ] Xarelto, [  ] Eliquis, [  ] Pradaxa, [  ] IV Heparin drip, [  ] SCD [  ] Contraindication 2 to GI Bleed,[  ] Ambulation [ X ] Contraindicated 2 to  bleed [  ] Contraindicated 2 to Brain Bleed  Advanced directive: [ X ] None, [  ] DNR/DNI Patient is a 84y old  Male who presents with a chief complaint of hematuria (12 Jun 2025 07:04)    HPI:  84 yr old M with PMH  HTN, COPD (on home 2L NC), BPH, AMY on CPAP, and gout presents to ED for hematuria and urinary retention. Pt reports since yesterday he has been trickling urine with blood and passing clots. Pt states he had retention 2 days ago and felt like he wasn't emptying out fully. Pt takes ASA 81mg daily and no other AC. Pt had TURP in 2022 and last saw urology in 2023. Pt reports severe constipation for the past 3 days. Wheeler bag currently with bright red urine.   Denies fevers, chills, HA, chest pain, SOB, abdominal pain, n/v.     ED course:  Vitals: BP: 116/69 , HR: 80 , Temp: 97.8 , RR: 20 , SpO2: 99% on RA   Labs significant for: Na 134. CBC wnl, rest of CMP wnl   UA: red, WBC 35, RBC too numerous, few bacteria   EKG: pending   In ED given: Rocephin x1, Ceftin x1, NS 500mL x1    Imaging  CXR: Clear lungs with no acute cardiopulmonary abnormalities.  CT a/p: Soft tissue density filling the bladder, unclear if hematoma or tumor.   (12 Jun 2025 01:16)    INTERVAL HPI:  6/12: Pt seen and examined at bedside. Pt endorses mild pain, only when he urinates. Has not had BM. On nocturnal CPAP, unclear settings. +wheeler in place  S/P CBI stopped by rology    OVERNIGHT EVENTS: None    Home Medications:  Albuterol (Eqv-ProAir HFA) 90 mcg/inh inhalation aerosol: 2 puff(s) inhaled 2 times a day (12 Jun 2025 04:25)  allopurinol 300 mg oral tablet: 1 tab(s) orally once a day (12 Jun 2025 04:13)  aspirin 81 mg oral tablet: 1 tab(s) orally once a day (12 Jun 2025 04:13)  furosemide 20 mg oral tablet: 1 tab(s) orally once a day (12 Jun 2025 04:13)  ipratropium-albuterol 0.5 mg-2.5 mg/3 mL inhalation solution: 2 puff(s) by nebulizer 2 times a day (12 Jun 2025 04:25)      MEDICATIONS  (STANDING):  albuterol    90 MICROgram(s) HFA Inhaler 2 Puff(s) Inhalation two times a day  allopurinol 300 milliGRAM(s) Oral daily  furosemide    Tablet 20 milliGRAM(s) Oral daily  pantoprazole    Tablet 40 milliGRAM(s) Oral before breakfast  polyethylene glycol 3350 17 Gram(s) Oral daily  senna 2 Tablet(s) Oral at bedtime  sorbitol 70%/mineral oil/magnesium hydroxide/glycerin Enema 120 milliLiter(s) Rectal once  tiotropium 2.5 MICROgram(s) Inhaler 2 Puff(s) Inhalation daily    MEDICATIONS  (PRN):  acetaminophen     Tablet .. 650 milliGRAM(s) Oral every 6 hours PRN Temp greater or equal to 38C (100.4F), Mild Pain (1 - 3)  aluminum hydroxide/magnesium hydroxide/simethicone Suspension 30 milliLiter(s) Oral every 4 hours PRN Dyspepsia  melatonin 3 milliGRAM(s) Oral at bedtime PRN Insomnia      Allergies    No Known Allergies    Intolerances        Social History:  Tobacco: denies  EtOH: denies  Recreational drug use: denies  Lives with: patient lives alone at home  Ambulates: independent  ADLs: independent (12 Jun 2025 01:16)      REVIEW OF SYSTEMS: i feel better   CONSTITUTIONAL: No fever, No chills, No fatigue, No myalgia, No Body ache, No Weakness  EYES: No eye pain,  No visual disturbances, No discharge, NO Redness  ENMT:  No ear pain, No nose bleed, No vertigo; No sinus pain, NO throat pain, No Congestion  NECK: No pain, No stiffness  RESPIRATORY: No cough, NO wheezing, No  hemoptysis, NO  shortness of breath  CARDIOVASCULAR: No chest pain, palpitations  GASTROINTESTINAL: No abdominal pain, NO epigastric pain. No nausea, No vomiting; No diarrhea, No constipation. [x  ] BM2 small  GENITOURINARY: No dysuria, No frequency, No urgency, No hematuria, NO incontinence  NEUROLOGICAL: No headaches, No dizziness, No numbness, No tingling, No tremors, No weakness  EXT: No Swelling, No Pain, No Edema  SKIN:  [ x ] No itching, burning, rashes, or lesions   MUSCULOSKELETAL: No joint pain ,No Jt swelling; No muscle pain, No back pain, No extremity pain  PSYCHIATRIC: No depression,  No anxiety,  No mood swings ,No difficulty sleeping at night  PAIN SCALE: [  ] None  [ X ] Other- Mild pain with urination  ROS Unable to obtain due to - [  ] Dementia  [  ] Lethargy [  ] Drowsy [  ] Sedated [  ] non verbal  REST OF REVIEW Of SYSTEM - [ X ] Normal     Vital Signs Last 24 Hrs  T(C): 36.9 (12 Jun 2025 05:59), Max: 37.1 (12 Jun 2025 02:18)  T(F): 98.4 (12 Jun 2025 05:59), Max: 98.8 (12 Jun 2025 02:18)  HR: 88 (12 Jun 2025 05:59) (76 - 99)  BP: 158/72 (12 Jun 2025 05:59) (118/79 - 185/76)  BP(mean): --  RR: 22 (12 Jun 2025 05:59) (18 - 22)  SpO2: 92% (12 Jun 2025 05:59) (92% - 99%)    Parameters below as of 12 Jun 2025 05:59  Patient On (Oxygen Delivery Method): room air      Finger Stick        06-11 @ 07:01  -  06-12 @ 07:00  --------------------------------------------------------  IN: 0 mL / OUT: 2200 mL / NET: -2200 mL        PHYSICAL EXAM: [place]  GENERAL:  [ X ] NAD , [ X ] well appearing, [  ] Agitated, [  ] Drowsy,  [  ] Lethargy, [  ] confused   HEAD:  [ X ] Normal, [  ] Other  EYES:  [ X ] EOMI, [ X ] PERRLA, [ X ] conjunctiva and sclera clear normal, [  ] Other,  [  ] Pallor,[  ] Discharge  ENMT:  [ X ] Normal, [ X ] Moist mucous membranes, [ X ] Good dentition, [ X ] No Thrush  NECK: [ X ] Supple, [ X ] No JVD, [ X ] Normal thyroid, [  ] Lymphadenopathy [  ] Other  CHEST/LUNG:  [ X ] Clear to auscultation bilaterally, [ X ] Breath Sounds equal B/L / Decrease, [  ] poor effort  [ X ] No rales, [ X ] No rhonchi  [ X ]  No wheezing,   HEART:  [ X ] Regular rate and rhythm, [  ] tachycardia, [  ] Bradycardia,  [  ] irregular  [ X ] No murmurs, No rubs, No gallops, [  ] PPM in place (Mfr:  )  ABDOMEN:  [ x ] Soft, [ X ] Nontender, [ X ] Nondistended, [ X ] No mass, [ X ] Bowel sounds present, [x ] obese  : [ X ] wheeler draining light pink urine  NERVOUS SYSTEM:  [ X ] Alert & Oriented X3, [ X ] Nonfocal  [  ] Confusion  [  ] Encephalopathic [  ] Sedated [  ] Unable to assess, [  ] Dementia [  ] Other-  EXTREMITIES: [ X ] 2+ Peripheral Pulses, No clubbing, No cyanosis,  [ X ] NO edema B/L lower EXT. [  ] PVD stasis skin changes B/L Lower EXT, [  ] wound  LYMPH: No lymphadenopathy noted  SKIN:  [x  ] No rashes or lesions, [  ] Pressure Ulcers, [  ] ecchymosis, [  ] Skin Tears, [  ] Other    DIET: Diet, Regular:   Lacto Veg (Accepts Milk & Milk Products) (06-12-25 @ 02:06)      LABS:                        12.5   11.38 )-----------( 220      ( 12 Jun 2025 05:16 )             36.9     12 Jun 2025 05:16    136    |  102    |  14     ----------------------------<  110    4.3     |  28     |  0.90     Ca    8.9        12 Jun 2025 05:16  Phos  3.4       12 Jun 2025 05:16  Mg     2.2       12 Jun 2025 05:16    TPro  7.2    /  Alb  3.6    /  TBili  0.9    /  DBili  x      /  AST  21     /  ALT  17     /  AlkPhos  104    12 Jun 2025 05:16    PT/INR - ( 11 Jun 2025 14:37 )   PT: 12.7 sec;   INR: 1.08 ratio         PTT - ( 11 Jun 2025 14:37 )  PTT:32.0 sec  Urinalysis Basic - ( 12 Jun 2025 05:16 )    Color: x / Appearance: x / SG: x / pH: x  Gluc: 110 mg/dL / Ketone: x  / Bili: x / Urobili: x   Blood: x / Protein: x / Nitrite: x   Leuk Esterase: x / RBC: x / WBC x   Sq Epi: x / Non Sq Epi: x / Bacteria: x      RADIOLOGY & ADDITIONAL TESTS:  < from: Xray Chest 1 View- PORTABLE-Urgent (Xray Chest 1 View- PORTABLE-Urgent .) (06.11.25 @ 15:06) >  IMPRESSION: Clear lungs with no acute cardiopulmonary abnormalities.    < end of copied text >  < from: CT Abdomen and Pelvis w/ IV Cont (06.11.25 @ 14:55) >  IMPRESSION:  Soft tissue density filling the bladder, unclear if hematoma or tumor.    < end of copied text >        HEALTH ISSUES - PROBLEM Dx:  Hematuria    HTN (hypertension)    BPH (benign prostatic hyperplasia)    Need for prophylactic measure    History of COPD    AMY (obstructive sleep apnea)    Gout            Consultant(s) Notes Reviewed:  [ X ] YES     Care Discussed with [X] Consultants  [ X ] Patient  [ x ] Family [  ] HCP [  ]   [  ] Social Service  [ x ] RN, [  ] Physical Therapy,[  ] Palliative care team  DVT PPX: [  ] Lovenox, [  ] S C Heparin, [  ] Coumadin, [  ] Xarelto, [  ] Eliquis, [  ] Pradaxa, [  ] IV Heparin drip, [  ] SCD [  ] Contraindication 2 to GI Bleed,[  ] Ambulation [ X ] Contraindicated 2 to  bleed [  ] Contraindicated 2 to Brain Bleed  Advanced directive: [ X ] None, [  ] DNR/DNI

## 2025-06-12 NOTE — DISCHARGE NOTE PROVIDER - HOSPITAL COURSE
FROM ADMISSION H+P:   HPI:  84 yr old M with PMH  HTN, COPD (on home 2L NC), BPH, AMY on CPAP, and gout presents to ED for hematuria and urinary retention. Pt reports since yesterday he has been trickling urine with blood and passing clots. Pt states he had retention 2 days ago and felt like he wasn't emptying out fully. Pt takes ASA 81mg daily and no other AC. Pt had TURP in 2022 and last saw urology in 2023. Pt reports severe constipation for the past 3 days. Wheeler bag currently with bright red urine.   Denies fevers, chills, HA, chest pain, SOB, abdominal pain, n/v.     ED course:  Vitals: BP: 116/69 , HR: 80 , Temp: 97.8 , RR: 20 , SpO2: 99% on RA   Labs significant for: Na 134. CBC wnl, rest of CMP wnl   UA: red, WBC 35, RBC too numerous, few bacteria   EKG: pending   In ED given: Rocephin x1, Ceftin x1, NS 500mL x1    Imaging  CXR: Clear lungs with no acute cardiopulmonary abnormalities.  CT a/p: Soft tissue density filling the bladder, unclear if hematoma or tumor.   (12 Jun 2025 01:16)      ---  HOSPITAL COURSE: Patient was admitted on 6/12/2025. Urology (Dr. Siegel) was consulted. Hematuria believed to be 2/2 patient straining as he was constipated. Home ASA was held. Pt connected to CBI on moderate rate with g1-g2 hematuria in tubing. CBI was titrated to light pink/clear urine. CBI was discontinued and wheeler output was monitored. Urine remained clear, and wheeler was discontinued per uro recs. H/H remained stable throughout admission. Pt was placed on bowel regimen and given SMOG enema to relieve underlying constipation.    The patient was examined at the bedside on the day of discharge. On day of discharge, patient is stable for discharge to home with close outpatient follow up with urology.    VS reviewed and within normal limits. No concerning findings on exam. Care plan reviewed with caregivers. Caregivers in agreement and endorse understanding.  Pt deemed stable for d/c home w/ anticipatory guidance and strict indications for return. No outstanding issues or concerns noted.     Vitals on day of discharge:  GENERAL: NAD, lying in bed comfortably  HEAD:  Atraumatic, normocephalic  EYES: EOMI, PERRLA, conjunctiva and sclera clear  ENT: Moist mucous membranes  NECK: Supple, no JVD  HEART: Regular rate and rhythm, no murmurs, rubs, or gallops  LUNGS: Unlabored respirations.  Clear to auscultation bilaterally, no crackles, wheezing, or rhonchi  ABDOMEN: Soft, nontender, nondistended, +BS  EXTREMITIES: 2+ peripheral pulses bilaterally. No clubbing, cyanosis, or edema  NERVOUS SYSTEM:  A&Ox3, no focal deficits   SKIN: Warm and dry    ---  CONSULTANTS:   Urology (Dr. Siegel)  ---  TIME SPENT:  I, the attending physician, was physically present for the key portions of the evaluation and management (E/M) service provided. The total amount of time spent reviewing the hospital notes, laboratory values, imaging findings, assessing/counseling the patient, discussing with consultant physicians, social work, nursing staff was -- minutes    ---  Primary care provider was made aware of plan for discharge:      [  ] NO     [  ] YES   FROM ADMISSION H+P:   HPI:  84 yr old M with PMH  HTN, COPD (on home 2L NC), BPH, AMY on CPAP, and gout presents to ED for hematuria and urinary retention. Pt reports since yesterday he has been trickling urine with blood and passing clots. Pt states he had retention 2 days ago and felt like he wasn't emptying out fully. Pt takes ASA 81mg daily and no other AC. Pt had TURP in 2022 and last saw urology in 2023. Pt reports severe constipation for the past 3 days. Wheeler bag currently with bright red urine.   Denies fevers, chills, HA, chest pain, SOB, abdominal pain, n/v.     ED course:  Vitals: BP: 116/69 , HR: 80 , Temp: 97.8 , RR: 20 , SpO2: 99% on RA   Labs significant for: Na 134. CBC wnl, rest of CMP wnl   UA: red, WBC 35, RBC too numerous, few bacteria   EKG: pending   In ED given: Rocephin x1, Ceftin x1, NS 500mL x1    Imaging  CXR: Clear lungs with no acute cardiopulmonary abnormalities.  CT a/p: Soft tissue density filling the bladder, unclear if hematoma or tumor.   (12 Jun 2025 01:16)      ---  HOSPITAL COURSE: Patient was admitted on 6/12/2025. Urology (Dr. Siegel) was consulted. Hematuria believed to be 2/2 patient straining as he was constipated. Home ASA was held. Pt connected to CBI on moderate rate with g1-g2 hematuria in tubing. CBI was titrated to light pink/clear urine. CBI was discontinued and wheeler output was monitored. Urine remained clear, and wheeler was discontinued per uro recs. H/H remained stable throughout admission. Pt was placed on bowel regimen and given SMOG enema to relieve underlying constipation.    The patient was examined at the bedside on the day of discharge. On day of discharge, patient is stable for discharge to home with close outpatient follow up with urology.    VS reviewed and within normal limits. No concerning findings on exam. Care plan reviewed with caregivers. Caregivers in agreement and endorse understanding.  Pt deemed stable for d/c home w/ anticipatory guidance and strict indications for return. No outstanding issues or concerns noted.     Vitals on day of discharge:  VITALS:   T(C): 36.6 (06-13-25 @ 05:16), Max: 37.1 (06-12-25 @ 12:33)  HR: 78 (06-13-25 @ 05:16) (78 - 98)  BP: 145/73 (06-13-25 @ 05:16) (123/68 - 145/73)  RR: 18 (06-13-25 @ 05:16) (18 - 18)  SpO2: 95% (06-13-25 @ 05:16) (89% - 95%)    Physical exam on day of discharge:  GENERAL: NAD, lying in bed comfortably  HEAD:  Atraumatic, normocephalic  EYES: EOMI, PERRLA, conjunctiva and sclera clear  ENT: Moist mucous membranes  NECK: Supple, no JVD  HEART: Regular rate and rhythm, no murmurs, rubs, or gallops  LUNGS: Unlabored respirations.  Clear to auscultation bilaterally, no crackles, wheezing, or rhonchi  ABDOMEN: Soft, nontender, nondistended, +BS  EXTREMITIES: 2+ peripheral pulses bilaterally. No clubbing, cyanosis, or edema  NERVOUS SYSTEM:  A&Ox3, no focal deficits   SKIN: Warm and dry    ---  CONSULTANTS:   Urology (Dr. Siegel)  ---  TIME SPENT:  I, the attending physician, was physically present for the key portions of the evaluation and management (E/M) service provided. The total amount of time spent reviewing the hospital notes, laboratory values, imaging findings, assessing/counseling the patient, discussing with consultant physicians, social work, nursing staff was -- minutes    ---  Primary care provider was made aware of plan for discharge:      [  ] NO     [  ] YES   FROM ADMISSION H+P:   HPI:  84 yr old M with PMH  HTN, COPD (on home 2L NC), BPH, AMY on CPAP, and gout presents to ED for hematuria and urinary retention. Pt reports since yesterday he has been trickling urine with blood and passing clots. Pt states he had retention 2 days ago and felt like he wasn't emptying out fully. Pt takes ASA 81mg daily and no other AC. Pt had TURP in 2022 and last saw urology in 2023. Pt reports severe constipation for the past 3 days. Wheeler bag currently with bright red urine.   Denies fevers, chills, HA, chest pain, SOB, abdominal pain, n/v.     ED course:  Vitals: BP: 116/69 , HR: 80 , Temp: 97.8 , RR: 20 , SpO2: 99% on RA   Labs significant for: Na 134. CBC wnl, rest of CMP wnl   UA: red, WBC 35, RBC too numerous, few bacteria   EKG: pending   In ED given: Rocephin x1, Ceftin x1, NS 500mL x1    Imaging  CXR: Clear lungs with no acute cardiopulmonary abnormalities.  CT a/p: Soft tissue density filling the bladder, unclear if hematoma or tumor.   (12 Jun 2025 01:16)      ---  HOSPITAL COURSE: Patient was admitted on 6/12/2025. Urology (Dr. Siegel) was consulted. Hematuria believed to be 2/2 patient straining as he was constipated. Home ASA was held. Pt connected to CBI on moderate rate with g1-g2 hematuria in tubing. CBI was titrated to light pink/clear urine. CBI was discontinued and wheeler output was monitored. Urine remained clear, and wheeler was discontinued per uro recs. H/H remained stable throughout admission. Pt was placed on bowel regimen and given SMOG enema to relieve underlying constipation.    The patient was examined at the bedside on the day of discharge. On day of discharge, patient is stable for discharge to home with close outpatient follow up with urology.    VS reviewed and within normal limits. No concerning findings on exam. Care plan reviewed with caregivers. Caregivers in agreement and endorse understanding.  Pt deemed stable for d/c home w/ anticipatory guidance and strict indications for return. No outstanding issues or concerns noted.     Vitals on day of discharge:  VITALS:   VITALS:   T(C): 36.5 (06-13-25 @ 11:50), Max: 36.6 (06-13-25 @ 05:16)  HR: 92 (06-13-25 @ 11:50) (78 - 92)  BP: 123/76 (06-13-25 @ 11:50) (123/68 - 145/73)  RR: 18 (06-13-25 @ 11:50) (18 - 18)  SpO2: 98% (06-13-25 @ 11:50) (89% - 98%)    Physical exam on day of discharge:  GENERAL: NAD, lying in bed comfortably  HEAD:  Atraumatic, normocephalic  EYES: EOMI, PERRLA, conjunctiva and sclera clear  ENT: Moist mucous membranes  NECK: Supple, no JVD  HEART: Regular rate and rhythm, no murmurs, rubs, or gallops  LUNGS: Unlabored respirations.  Clear to auscultation bilaterally, no crackles, wheezing, or rhonchi  ABDOMEN: Soft, nontender, nondistended, +BS  EXTREMITIES: 2+ peripheral pulses bilaterally. No clubbing, cyanosis, or edema  NERVOUS SYSTEM:  A&Ox3, no focal deficits   SKIN: Warm and dry    ---  CONSULTANTS:   Urology (Dr. Siegel)  ---  TIME SPENT:  I, the attending physician, was physically present for the key portions of the evaluation and management (E/M) service provided. The total amount of time spent reviewing the hospital notes, laboratory values, imaging findings, assessing/counseling the patient, discussing with consultant physicians, social work, nursing staff was -- minutes    ---  Primary care provider was made aware of plan for discharge:      [  ] NO     [  ] YES   FROM ADMISSION H+P:   HPI:  84 yr old M with PMH  HTN, COPD (on home 2L NC), BPH, AMY on CPAP, and gout presents to ED for hematuria and urinary retention. Pt reports since yesterday he has been trickling urine with blood and passing clots. Pt states he had retention 2 days ago and felt like he wasn't emptying out fully. Pt takes ASA 81mg daily and no other AC. Pt had TURP in 2022 and last saw urology in 2023. Pt reports severe constipation for the past 3 days. Wheeler bag currently with bright red urine.   Denies fevers, chills, HA, chest pain, SOB, abdominal pain, n/v.     ED course:  Vitals: BP: 116/69 , HR: 80 , Temp: 97.8 , RR: 20 , SpO2: 99% on RA   Labs significant for: Na 134. CBC wnl, rest of CMP wnl   UA: red, WBC 35, RBC too numerous, few bacteria   EKG: pending   In ED given: Rocephin x1, Ceftin x1, NS 500mL x1    Imaging  CXR: Clear lungs with no acute cardiopulmonary abnormalities.  CT a/p: Soft tissue density filling the bladder, unclear if hematoma or tumor.   (12 Jun 2025 01:16)      ---  HOSPITAL COURSE: Patient was admitted on 6/12/2025. Urology (Dr. Siegel) was consulted. Hematuria believed to be 2/2 patient straining as he was constipated. Home ASA was held. Pt connected to CBI on moderate rate with g1-g2 hematuria in tubing. CBI was titrated to light pink/clear urine. CBI was discontinued and wheeler output was monitored. Urine remained clear, and wheeler was discontinued per uro recs. H/H remained stable throughout admission. Pt was placed on bowel regimen and given SMOG enema to relieve underlying constipation. Wheeler was D/c for TOV, pt voided Rx Constipation .Urine culture-NEG     The patient was examined at the bedside on the day of discharge. On day of discharge, patient is stable for discharge to home with close outpatient follow up with urology.    VS reviewed and within normal limits. No concerning findings on exam. Care plan reviewed with caregivers. Caregivers in agreement and endorse understanding.  Pt deemed stable for d/c home w/ anticipatory guidance and strict indications for return. No outstanding issues or concerns noted.     Vitals on day of discharge:  VITALS:   VITALS:   T(C): 36.5 (06-13-25 @ 11:50), Max: 36.6 (06-13-25 @ 05:16)  HR: 92 (06-13-25 @ 11:50) (78 - 92)  BP: 123/76 (06-13-25 @ 11:50) (123/68 - 145/73)  RR: 18 (06-13-25 @ 11:50) (18 - 18)  SpO2: 98% (06-13-25 @ 11:50) (89% - 98%)    Physical exam on day of discharge:  GENERAL: NAD, lying in bed comfortably  HEAD:  Atraumatic, normocephalic  EYES: EOMI, PERRLA, conjunctiva and sclera clear  ENT: Moist mucous membranes  NECK: Supple, no JVD  HEART: Regular rate and rhythm, no murmurs, rubs, or gallops  LUNGS: Unlabored respirations.  Clear to auscultation bilaterally, no crackles, wheezing, or rhonchi  ABDOMEN: Soft, nontender, nondistended, +BS  EXTREMITIES: 2+ peripheral pulses bilaterally. No clubbing, cyanosis, or edema  NERVOUS SYSTEM:  A&Ox3, no focal deficits   SKIN: Warm and dry    ---  CONSULTANTS:   Urology (Dr. Siegel)  ---  TIME SPENT:  I, the attending physician, was physically present for the key portions of the evaluation and management (E/M) service provided. The total amount of time spent reviewing the hospital notes, laboratory values, imaging findings, assessing/counseling the patient, discussing with consultant physicians, social work, nursing staff was 60 minutes

## 2025-06-12 NOTE — H&P ADULT - HISTORY OF PRESENT ILLNESS
84 yr old M with PMH  HTN, COPD, BPH presents to ED for hematuria and urinary retention. Pt reports since yesterday he has been trickling urine with blood and passing clots. Pt states he had retention 2 days ago and felt like he wasn't emptying out fully. Pt takes ASA 81mg daily and no other AC. Pt had TURP in 2022 and last saw urology in 2023.   Denies fevers, chills, HA, chest pain, SOB, abdominal pain, n/v.     ED course:  Vitals: BP: 116/69 , HR: 80 , Temp: 97.8 , RR: 20 , SpO2: 99% on RA   Labs significant for: Na 134. CBC wnl, rest of CMP wnl   UA: red, WBC 35, RBC too numerous, few bacteria   EKG: pending   In ED given: Rocephin x1, Ceftin x1, NS 500mL x1    Imaging  CXR: Clear lungs with no acute cardiopulmonary abnormalities.  CT a/p: Soft tissue density filling the bladder, unclear if hematoma or tumor.   84 yr old M with PMH  HTN, COPD (on home 2L NC), BPH, AMY on CPAP, and gout presents to ED for hematuria and urinary retention. Pt reports since yesterday he has been trickling urine with blood and passing clots. Pt states he had retention 2 days ago and felt like he wasn't emptying out fully. Pt takes ASA 81mg daily and no other AC. Pt had TURP in 2022 and last saw urology in 2023. Pt reports severe constipation for the past 3 days. Ojeda bag currently with bright red urine.   Denies fevers, chills, HA, chest pain, SOB, abdominal pain, n/v.     ED course:  Vitals: BP: 116/69 , HR: 80 , Temp: 97.8 , RR: 20 , SpO2: 99% on RA   Labs significant for: Na 134. CBC wnl, rest of CMP wnl   UA: red, WBC 35, RBC too numerous, few bacteria   EKG: pending   In ED given: Rocephin x1, Ceftin x1, NS 500mL x1    Imaging  CXR: Clear lungs with no acute cardiopulmonary abnormalities.  CT a/p: Soft tissue density filling the bladder, unclear if hematoma or tumor.   84 yr old M with PMH  HTN, COPD (on home 2L NC), BPH ,s/p TURP  AMY on CPAP, and gout presents to ED for hematuria and urinary retention. Pt reports since yesterday he has been trickling urine with blood and passing clots. Pt states he had retention 2 days ago and felt like he wasn't emptying out fully. Pt takes ASA 81mg daily and no other AC. Pt had TURP in 2022 and last saw urology in 2023. Pt reports severe constipation for the past 3 days. pt was straining to have BM ,CBI started in ER, Pt was in urinary Retention ,Ojeda bag currently with bright red urine.   Denies fevers, chills, HA, chest pain, SOB, abdominal pain, n/v.     ED course:  Vitals: BP: 116/69 , HR: 80 , Temp: 97.8 , RR: 20 , SpO2: 99% on RA   Labs significant for: Na 134. CBC wnl, rest of CMP wnl   UA: red, WBC 35, RBC too numerous, few bacteria   EKG: pending   In ED given: Rocephin x1, Ceftin x1, NS 500mL x1    Imaging  CXR: Clear lungs with no acute cardiopulmonary abnormalities.  CT a/p: Soft tissue density filling the bladder, unclear if hematoma or tumor.

## 2025-06-12 NOTE — PROGRESS NOTE ADULT - PROBLEM SELECTOR PLAN 1
-Pt presents to ED for hematuria and urinary retention. Pt reports since yesterday he has been trickling urine with blood and passing clots  -Vitals: BP: 116/69 , HR: 80 , Temp: 97.8 , RR: 20 , SpO2: 99% on RA   -Labs significant for: Na 134. CBC wnl, rest of CMP wnl   -UA: red, WBC 35, RBC too numerous, few bacteria   -In ED given: Rocephin x1, Ceftin x1, NS 500mL x1  - s/p CBI, now with wheeler with light pink urine  - H/H stable   - monitor wheeler output   - f/u daily CBCs  - hold home aspirin   - Urology consulted (Dr. Abernathy), f/u recs

## 2025-06-12 NOTE — H&P ADULT - PROBLEM SELECTOR PLAN 3
-chronic  -s/p TURP in 2022  -continue home -chronic   -on home cpap machine -chronic   -on home CPAP machine

## 2025-06-12 NOTE — H&P ADULT - NEGATIVE CARDIOVASCULAR SYMPTOMS
no chest pain/no palpitations no chest pain/no palpitations/no dyspnea on exertion/no peripheral edema

## 2025-06-12 NOTE — H&P ADULT - NEUROLOGICAL
details… AAOx 3/normal/cranial nerves II-XII intact/sensation intact/deep reflexes intact/superficial reflexes intact

## 2025-06-12 NOTE — PROGRESS NOTE ADULT - SUBJECTIVE AND OBJECTIVE BOX
INTERVAL HPI/OVERNIGHT EVENTS: Urine clear overnight.    MEDICATIONS  (STANDING):  albuterol    90 MICROgram(s) HFA Inhaler 2 Puff(s) Inhalation two times a day  allopurinol 300 milliGRAM(s) Oral daily  furosemide    Tablet 20 milliGRAM(s) Oral daily  pantoprazole    Tablet 40 milliGRAM(s) Oral before breakfast  polyethylene glycol 3350 17 Gram(s) Oral daily  senna 2 Tablet(s) Oral at bedtime  tiotropium 2.5 MICROgram(s) Inhaler 2 Puff(s) Inhalation daily    MEDICATIONS  (PRN):  acetaminophen     Tablet .. 650 milliGRAM(s) Oral every 6 hours PRN Temp greater or equal to 38C (100.4F), Mild Pain (1 - 3)  aluminum hydroxide/magnesium hydroxide/simethicone Suspension 30 milliLiter(s) Oral every 4 hours PRN Dyspepsia  melatonin 3 milliGRAM(s) Oral at bedtime PRN Insomnia        Vital Signs Last 24 Hrs  T(C): 36.9 (12 Jun 2025 05:59), Max: 37.1 (12 Jun 2025 02:18)  T(F): 98.4 (12 Jun 2025 05:59), Max: 98.8 (12 Jun 2025 02:18)  HR: 88 (12 Jun 2025 05:59) (76 - 99)  BP: 158/72 (12 Jun 2025 05:59) (118/79 - 185/76)  BP(mean): --  RR: 22 (12 Jun 2025 05:59) (18 - 22)  SpO2: 92% (12 Jun 2025 05:59) (92% - 99%)    Parameters below as of 12 Jun 2025 05:59  Patient On (Oxygen Delivery Method): room air        PHYSICAL EXAM:    ABDOMEN: Soft. Mild SP distention and tenderness  GENITALIA: Ojeda in place. Drainage blood tinged.    LABS:                        12.5   11.38 )-----------( 220      ( 12 Jun 2025 05:16 )             36.9     06-12    136  |  102  |  14  ----------------------------<  110[H]  4.3   |  28  |  0.90    Ca    8.9      12 Jun 2025 05:16  Phos  3.4     06-12  Mg     2.2     06-12    TPro  7.2  /  Alb  3.6  /  TBili  0.9  /  DBili  x   /  AST  21  /  ALT  17  /  AlkPhos  104  06-12    PT/INR - ( 11 Jun 2025 14:37 )   PT: 12.7 sec;   INR: 1.08 ratio         PTT - ( 11 Jun 2025 14:37 )  PTT:32.0 sec  Urinalysis Basic - ( 12 Jun 2025 05:16 )    Color: x / Appearance: x / SG: x / pH: x  Gluc: 110 mg/dL / Ketone: x  / Bili: x / Urobili: x   Blood: x / Protein: x / Nitrite: x   Leuk Esterase: x / RBC: x / WBC x   Sq Epi: x / Non Sq Epi: x / Bacteria: x

## 2025-06-12 NOTE — DISCHARGE NOTE PROVIDER - NSDCCPCAREPLAN_GEN_ALL_CORE_FT
PRINCIPAL DISCHARGE DIAGNOSIS  Diagnosis: Hematuria  Assessment and Plan of Treatment: You were admitted to the hospital for hematuria, or blood in your urine. You were evaluated by urology. You were treated with IV fluids and a machine called a continuous bladder  to wash out any of the blood in your urinary system. The urologist believed that the blood in your urine was likely caused by you straining to have a bowel movemen  - Please keep hydrated  - Avoid caffeine, tea, and carbonated beverages because they tend to irritate the bladder.  - Avoid alcohol because it may irritate the prostate.  - Empty your bladder often. Avoid holding urine for long periods of time.  - Please seek medical care if you develop a fever, more blood in your urine, severe back or abdominal pain or fever  Please follow up within one week with urology for an outpatient cystoscopy and your primary care physician for continued medical management.         SECONDARY DISCHARGE DIAGNOSES  Diagnosis: Constipation  Assessment and Plan of Treatment: You had severe constipation prior to and during your admission. The urologist believed that the blood in your urine was likely caused by you straining to have a bowel movement. You were started on a bowel regimen in the hospital and given an enema to relieve your constipation.  - Please take miralax daily  - Please take senna at bed time  - Please keep hydrated, and drink enough water throughout the day  Please follow up with your primary care physician within 1 week of discharge for continued medical management.       PRINCIPAL DISCHARGE DIAGNOSIS  Diagnosis: Hematuria  Assessment and Plan of Treatment: You were admitted to the hospital for hematuria, or blood in your urine. You were evaluated by urology. You were treated with IV fluids and a machine called a continuous bladder  to wash out any of the blood in your urinary system. The urologist believed that the blood in your urine was likely caused by you straining to have a bowel movemen  - Please STOP Aspirin  - Please keep hydrated  - Avoid caffeine, tea, and carbonated beverages because they tend to irritate the bladder.  - Avoid alcohol because it may irritate the prostate.  - Empty your bladder often. Avoid holding urine for long periods of time.  - Please seek medical care if you develop a fever, more blood in your urine, severe back or abdominal pain or fever  Please follow up within one week with urology for an outpatient cystoscopy and your primary care physician for continued medical management.         SECONDARY DISCHARGE DIAGNOSES  Diagnosis: Constipation  Assessment and Plan of Treatment: You had severe constipation prior to and during your admission. The urologist believed that the blood in your urine was likely caused by you straining to have a bowel movement. You were started on a bowel regimen in the hospital and given an enema to relieve your constipation.  - Please take miralax daily  - Please take senna at bed time  - Please keep hydrated, and drink enough water throughout the day  Please follow up with your primary care physician within 1 week of discharge for continued medical management.       PRINCIPAL DISCHARGE DIAGNOSIS  Diagnosis: Hematuria  Assessment and Plan of Treatment: You were admitted to the hospital for hematuria, or blood in your urine. You were evaluated by urology. You were treated with IV fluids and a machine called a continuous bladder  to wash out any of the blood in your urinary system. The urologist believed that the blood in your urine was likely caused by you straining to have a bowel movemen  - Please STOP Aspirin for 1 week  - Please keep hydrated  - Avoid caffeine, tea, and carbonated beverages because they tend to irritate the bladder.  - Avoid alcohol because it may irritate the prostate.  - Empty your bladder often. Avoid holding urine for long periods of time.  - Please seek medical care if you develop a fever, more blood in your urine, severe back or abdominal pain or fever  Please follow up within one week with urology for an outpatient cystoscopy and your primary care physician for continued medical management.         SECONDARY DISCHARGE DIAGNOSES  Diagnosis: Constipation  Assessment and Plan of Treatment: You had severe constipation prior to and during your admission. The urologist believed that the blood in your urine was likely caused by you straining to have a bowel movement. You were started on a bowel regimen in the hospital and given an enema to relieve your constipation.  - Please take miralax daily  - Please take senna at bed time  - Please keep hydrated, and drink enough water throughout the day  Please follow up with your primary care physician within 1 week of discharge for continued medical management.       PRINCIPAL DISCHARGE DIAGNOSIS  Diagnosis: Hematuria  Assessment and Plan of Treatment: You were admitted to the hospital for hematuria, or blood in your urine. You were evaluated by urology in ER   CT A/P showed   IMPRESSION:  Soft tissue density filling the bladder, unclear if hematoma or tumor.  -You MUST follow up with DR Siegel in office in 2-3 weeks for further work up.  . You were treated with CBI  called a continuous bladder  to wash out  & IVF any of the blood in your urinary system. The urologist believed that the blood in your urine was likely caused by you straining to have a bowel movemen  -Treat Constipation  - Please STOP Aspirin for 5 days    - Please keep hydrated  - Avoid caffeine, tea, and carbonated beverages because they tend to irritate the bladder.  - Avoid alcohol because it may irritate the prostate.  - Empty your bladder often. Avoid holding urine for long periods of time.  - Please seek medical care if you develop a fever, more blood in your urine, severe back or abdominal pain or fever  Please follow up within one week with urology for an outpatient cystoscopy and your primary care physician for continued medical management.         SECONDARY DISCHARGE DIAGNOSES  Diagnosis: History of COPD  Assessment and Plan of Treatment: Continue Home Inh       Diagnosis: Constipation  Assessment and Plan of Treatment: You had severe constipation prior to and during your admission. The urologist believed that the blood in your urine was likely caused by you straining to have a bowel movement. You were started on a bowel regimen in the hospital and given an enema to relieve your constipation.  - Please take miralax daily with water   - Please take senna at bed time  - Please keep hydrated, and drink enough water throughout the day  Please follow up with your primary care physician within 1 week of discharge for continued medical management.      Diagnosis: AMY (obstructive sleep apnea)  Assessment and Plan of Treatment: Continue C PAP at bed time    Diagnosis: BPH (benign prostatic hyperplasia)  Assessment and Plan of Treatment: S/P TURP  Follow up with urologist in 2-3 weeks    Diagnosis: Gout  Assessment and Plan of Treatment: Continue Allopurinol daily    Diagnosis: Edema leg  Assessment and Plan of Treatment: Continue Lasix 20 mg 1 tab daily

## 2025-06-12 NOTE — PHYSICAL THERAPY INITIAL EVALUATION ADULT - PERTINENT HX OF CURRENT PROBLEM, REHAB EVAL
84 yr old M with PMH  HTN, COPD (on home 2L NC), BPH, AMY on CPAP, and gout presents to ED for hematuria and urinary retention. Pt reports since yesterday he has been trickling urine with blood and passing clots. Pt states he had retention 2 days ago and felt like he wasn't emptying out fully. Pt takes ASA 81mg daily and no other AC. Pt had TURP in 2022 and last saw urology in 2023. Pt reports severe constipation for the past 3 days. Ojeda bag currently with bright red urine.   Denies fevers, chills, HA, chest pain, SOB, abdominal pain, n/v.

## 2025-06-12 NOTE — PROGRESS NOTE ADULT - PROBLEM SELECTOR PLAN 4
-on home O2 2L NC  -pt on home albuterol and states he also uses spiriva, will add -on home O2 2L NC PRN  -pt on home albuterol and states he also uses spiriva, however per chart review, is only prescribed albuterol PRN -on home O2 2L NC PRN  -pt on home albuterol and states he also uses Spiriva, however per chart review, is only prescribed albuterol PRN

## 2025-06-12 NOTE — H&P ADULT - NSHPSOCIALHISTORY_GEN_ALL_CORE
Tobacco: denies  EtOH: denies  Recreational drug use: denies  Lives with: patient lives alone at home  Ambulates: independent  ADLs: independent

## 2025-06-12 NOTE — PHYSICAL THERAPY INITIAL EVALUATION ADULT - GAIT TRAINING, PT EVAL
Patient will ambulate x 100 feet independently  in 2 weeks in order to increase mobility at home
normal...

## 2025-06-12 NOTE — H&P ADULT - GASTROINTESTINAL
details… normal/nontender/nondistended/normal active bowel sounds/no guarding/no rigidity/no organomegaly/no palpable pankaj/no masses palpable

## 2025-06-12 NOTE — H&P ADULT - PROBLEM SELECTOR PLAN 2
-chronic  -BP on admission: 116/69  -continue home meds () with hold parameters for systolic BP <110  -continue to monitor hemodynamics -chronic  -BP on admission: 116/69  -continue home Lasix 20mg qd with hold parameters for systolic BP <110  -continue to monitor hemodynamics

## 2025-06-12 NOTE — DISCHARGE NOTE PROVIDER - CARE PROVIDER_API CALL
Davonte Siegel  Urology  [address], [address]  [place] [address]  Phone: [phone]  Fax: [phone]  Follow Up Time: 1 week    Ghanshyam Mathias  Internal Medicine  [address] [place] [address]  Phone: [phone]  Fax: [phone]  Established Patient  Follow Up Time: 1 week    JOSIE CUNNINGHAM NP  Phone: [phone]  Fax: [phone]  Established Patient  Follow Up Time: 1 week

## 2025-06-12 NOTE — PHYSICAL THERAPY INITIAL EVALUATION ADULT - LEVEL OF INDEPENDENCE: STAIR NEGOTIATION, REHAB EVAL
Does not have stairs at home Tetracycline Pregnancy And Lactation Text: This medication is Pregnancy Category D and not consider safe during pregnancy. It is also excreted in breast milk.

## 2025-06-12 NOTE — H&P ADULT - PROBLEM SELECTOR PLAN 4
-SCDs in setting of hematuria  -PT eval -on home O2 2L NC  -pt on home albuterol and states he also uses an inhaler (ipratropium albuterol as per surescripts)  -continue inhalers

## 2025-06-12 NOTE — DISCHARGE NOTE PROVIDER - PROVIDER TOKENS
PROVIDER:[TOKEN:[[id number]],FOLLOWUP:[1 week]],PROVIDER:[TOKEN:[[id number]],FOLLOWUP:[1 week],ESTABLISHEDPATIENT:[T]],PROVIDER:[TOKEN:[[id number]],FOLLOWUP:[1 week],ESTABLISHEDPATIENT:[T]]

## 2025-06-12 NOTE — PROGRESS NOTE ADULT - ATTENDING COMMENTS
84 yr old M with PMH  HTN and BPH S/P TURP  presents to ED for hematuria and urinary retention. Pt admitted for hematuria. Started on CBI   Pt seen, examined, case & care plan d/w p residents at detail.  Consult-  Urology -Dr Siegel -STOP CBI, D/C wheeler TOV   PO diet  PT Eval  SCD  AM albs   OOB to chair with assist  will d/c wheeler cath in 5 AM for TOV  Rx Constipation   Total care time is 60 minutes.

## 2025-06-13 VITALS
RESPIRATION RATE: 18 BRPM | TEMPERATURE: 98 F | HEART RATE: 92 BPM | OXYGEN SATURATION: 98 % | DIASTOLIC BLOOD PRESSURE: 76 MMHG | SYSTOLIC BLOOD PRESSURE: 123 MMHG

## 2025-06-13 LAB
ANION GAP SERPL CALC-SCNC: 8 MMOL/L — SIGNIFICANT CHANGE UP (ref 5–17)
BUN SERPL-MCNC: 14 MG/DL — SIGNIFICANT CHANGE UP (ref 7–23)
CALCIUM SERPL-MCNC: 8.8 MG/DL — SIGNIFICANT CHANGE UP (ref 8.5–10.1)
CHLORIDE SERPL-SCNC: 99 MMOL/L — SIGNIFICANT CHANGE UP (ref 96–108)
CO2 SERPL-SCNC: 30 MMOL/L — SIGNIFICANT CHANGE UP (ref 22–31)
CREAT SERPL-MCNC: 0.94 MG/DL — SIGNIFICANT CHANGE UP (ref 0.5–1.3)
EGFR: 81 ML/MIN/1.73M2 — SIGNIFICANT CHANGE UP
EGFR: 81 ML/MIN/1.73M2 — SIGNIFICANT CHANGE UP
GLUCOSE SERPL-MCNC: 113 MG/DL — HIGH (ref 70–99)
HCT VFR BLD CALC: 36.8 % — LOW (ref 39–50)
HGB BLD-MCNC: 12.4 G/DL — LOW (ref 13–17)
MCHC RBC-ENTMCNC: 30.3 PG — SIGNIFICANT CHANGE UP (ref 27–34)
MCHC RBC-ENTMCNC: 33.7 G/DL — SIGNIFICANT CHANGE UP (ref 32–36)
MCV RBC AUTO: 90 FL — SIGNIFICANT CHANGE UP (ref 80–100)
NRBC BLD AUTO-RTO: 0 /100 WBCS — SIGNIFICANT CHANGE UP (ref 0–0)
PLATELET # BLD AUTO: 221 K/UL — SIGNIFICANT CHANGE UP (ref 150–400)
POTASSIUM SERPL-MCNC: 3.9 MMOL/L — SIGNIFICANT CHANGE UP (ref 3.5–5.3)
POTASSIUM SERPL-SCNC: 3.9 MMOL/L — SIGNIFICANT CHANGE UP (ref 3.5–5.3)
RBC # BLD: 4.09 M/UL — LOW (ref 4.2–5.8)
RBC # FLD: 14.5 % — SIGNIFICANT CHANGE UP (ref 10.3–14.5)
SODIUM SERPL-SCNC: 137 MMOL/L — SIGNIFICANT CHANGE UP (ref 135–145)
WBC # BLD: 8.44 K/UL — SIGNIFICANT CHANGE UP (ref 3.8–10.5)
WBC # FLD AUTO: 8.44 K/UL — SIGNIFICANT CHANGE UP (ref 3.8–10.5)

## 2025-06-13 PROCEDURE — 80048 BASIC METABOLIC PNL TOTAL CA: CPT

## 2025-06-13 PROCEDURE — 84100 ASSAY OF PHOSPHORUS: CPT

## 2025-06-13 PROCEDURE — 71045 X-RAY EXAM CHEST 1 VIEW: CPT

## 2025-06-13 PROCEDURE — 86901 BLOOD TYPING SEROLOGIC RH(D): CPT

## 2025-06-13 PROCEDURE — 86850 RBC ANTIBODY SCREEN: CPT

## 2025-06-13 PROCEDURE — 74177 CT ABD & PELVIS W/CONTRAST: CPT

## 2025-06-13 PROCEDURE — 93005 ELECTROCARDIOGRAM TRACING: CPT

## 2025-06-13 PROCEDURE — 87086 URINE CULTURE/COLONY COUNT: CPT

## 2025-06-13 PROCEDURE — 86900 BLOOD TYPING SEROLOGIC ABO: CPT

## 2025-06-13 PROCEDURE — 99285 EMERGENCY DEPT VISIT HI MDM: CPT | Mod: 25

## 2025-06-13 PROCEDURE — 81001 URINALYSIS AUTO W/SCOPE: CPT

## 2025-06-13 PROCEDURE — 96374 THER/PROPH/DIAG INJ IV PUSH: CPT

## 2025-06-13 PROCEDURE — 85027 COMPLETE CBC AUTOMATED: CPT

## 2025-06-13 PROCEDURE — 85610 PROTHROMBIN TIME: CPT

## 2025-06-13 PROCEDURE — 80053 COMPREHEN METABOLIC PANEL: CPT

## 2025-06-13 PROCEDURE — 97162 PT EVAL MOD COMPLEX 30 MIN: CPT

## 2025-06-13 PROCEDURE — 36415 COLL VENOUS BLD VENIPUNCTURE: CPT

## 2025-06-13 PROCEDURE — 85025 COMPLETE CBC W/AUTO DIFF WBC: CPT

## 2025-06-13 PROCEDURE — 94640 AIRWAY INHALATION TREATMENT: CPT

## 2025-06-13 PROCEDURE — 83735 ASSAY OF MAGNESIUM: CPT

## 2025-06-13 PROCEDURE — 85730 THROMBOPLASTIN TIME PARTIAL: CPT

## 2025-06-13 RX ORDER — ASPIRIN 325 MG
1 TABLET ORAL
Refills: 0 | DISCHARGE

## 2025-06-13 RX ORDER — IPRATROPIUM BROMIDE AND ALBUTEROL SULFATE .5; 2.5 MG/3ML; MG/3ML
2 SOLUTION RESPIRATORY (INHALATION)
Refills: 0 | DISCHARGE

## 2025-06-13 RX ORDER — SENNA 187 MG
2 TABLET ORAL
Qty: 60 | Refills: 0
Start: 2025-06-13 | End: 2025-07-12

## 2025-06-13 RX ORDER — POLYETHYLENE GLYCOL 3350 17 G/17G
17 POWDER, FOR SOLUTION ORAL
Qty: 510 | Refills: 0
Start: 2025-06-13 | End: 2025-07-12

## 2025-06-13 RX ORDER — ASPIRIN 325 MG
1 TABLET ORAL
Qty: 0 | Refills: 0 | DISCHARGE

## 2025-06-13 RX ADMIN — TIOTROPIUM BROMIDE INHALATION SPRAY 2 PUFF(S): 3.12 SPRAY, METERED RESPIRATORY (INHALATION) at 08:01

## 2025-06-13 RX ADMIN — MAGNESIUM HYDROXIDE 30 MILLILITER(S): 400 SUSPENSION ORAL at 11:51

## 2025-06-13 RX ADMIN — Medication 40 MILLIGRAM(S): at 05:20

## 2025-06-13 RX ADMIN — Medication 300 MILLIGRAM(S): at 11:51

## 2025-06-13 RX ADMIN — POLYETHYLENE GLYCOL 3350 17 GRAM(S): 17 POWDER, FOR SOLUTION ORAL at 11:52

## 2025-06-13 RX ADMIN — Medication 2 PUFF(S): at 08:02

## 2025-06-13 RX ADMIN — FUROSEMIDE 20 MILLIGRAM(S): 10 INJECTION INTRAMUSCULAR; INTRAVENOUS at 05:20

## 2025-06-13 NOTE — PROGRESS NOTE ADULT - PROBLEM SELECTOR PLAN 1
Resolving. Patient can be discharged from  standpoint. He was advised to follow up in 2 weeks and that further evaluation with cystoscopy will be necessary to rule out bladder pathology.

## 2025-06-13 NOTE — PROGRESS NOTE ADULT - PROBLEM SELECTOR PLAN 4
-on home O2 2L NC PRN  - pt on home albuterol and states he also uses Spiriva, however per chart review, is only prescribed albuterol PRN

## 2025-06-13 NOTE — PROGRESS NOTE ADULT - PROBLEM SELECTOR PLAN 1
-Pt presents to ED for hematuria and urinary retention. Pt reports since yesterday he has been trickling urine with blood and passing clots  -UA: red, WBC 35, RBC too numerous, few bacteria   - In ED given: Rocephin x1, Ceftin x1, NS 500mL x1  - s/p CBI, now with wheeler with light pink urine  - wheeler removed this AM, has urinated and had BM   - H/H stable   - f/u daily CBCs  - hold home aspirin   - Urology consulted (Dr. Abernathy), f/u recs -Pt presents to ED for hematuria and urinary retention. Pt reports since yesterday he has been trickling urine with blood and passing clots  - In ED given: Rocephin x1, Ceftin x1, NS 500mL x1  - s/p CBI, s/p  wheeler Removed for TOV   - wheeler removed this AM, has urinated and had BM   - H/H stable   - hold home aspirin for 2 more days than restart from next week   - Urology Dr Siegel -out pt follow up for abnormal CT scan findings   CT a/p: Soft tissue density filling the bladder, unclear if hematoma or tumor.  D/W Pt & family at detail abut CT scan results-follow up in 2-3 weeks as out pt -Family & pt agrees

## 2025-06-13 NOTE — PROGRESS NOTE ADULT - ATTENDING COMMENTS
84 yr old M with PMH  HTN and BPH S/P TURP  presents to ED for hematuria and urinary retention. Pt admitted for hematuria. Started on CBI   Pt seen, examined, case & care plan d/w p residents at detail.  D/W Family-son & wife at bed side at detail.  Consult-  Urology -Dr Siegel -Stable for d/c home ,Pt Voided , out pt follow up   PO diet  PT Eval--> No need   SCD  D/C Home today  Total d/c  care time is 60 minutes.

## 2025-06-13 NOTE — DISCHARGE NOTE NURSING/CASE MANAGEMENT/SOCIAL WORK - FINANCIAL ASSISTANCE
[facility] provides services at a reduced cost to those who are determined to be eligible through [facility]’s financial assistance program. Information regarding [facility]’s financial assistance program can be found by going to [email or web address] or by calling [phone].

## 2025-06-13 NOTE — CASE MANAGEMENT PROGRESS NOTE - NSCMPROGRESSNOTE_GEN_ALL_CORE
Discussed patient with Dr. Smalls and plan for transition to home today. CM met with patient and family who verbalize understanding of plan. No new needs identified and  will transport home. CM will remain available.

## 2025-06-13 NOTE — PROGRESS NOTE ADULT - PROBLEM SELECTOR PLAN 6
Problem: Potential for Falls  Goal: Patient will remain free of falls  Description: INTERVENTIONS:  - Educate patient/family on patient safety including physical limitations  - Instruct patient to call for assistance with activity   - Consult OT/PT to assist with strengthening/mobility   - Keep Call bell within reach  - Keep bed low and locked with side rails adjusted as appropriate  - Keep care items and personal belongings within reach  - Initiate and maintain comfort rounds  - Make Fall Risk Sign visible to staff  - Offer Toileting every 2 Hours, in advance of need  - Initiate/Maintain bed/chair alarm  - Obtain necessary fall risk management equipment:   - Apply yellow socks and bracelet for high fall risk patients  - Consider moving patient to room near nurses station  Outcome: Progressing     
-chronic  -s/p TURP in 2022  -not on any medication
-chronic  -s/p TURP in 2022  -not on any medication

## 2025-06-13 NOTE — PROGRESS NOTE ADULT - SUBJECTIVE AND OBJECTIVE BOX
INTERVAL HPI/OVERNIGHT EVENTS: Voiding well. Urine clearing.    MEDICATIONS  (STANDING):  albuterol    90 MICROgram(s) HFA Inhaler 2 Puff(s) Inhalation two times a day  allopurinol 300 milliGRAM(s) Oral daily  furosemide    Tablet 20 milliGRAM(s) Oral daily  magnesium hydroxide Suspension 30 milliLiter(s) Oral daily  pantoprazole    Tablet 40 milliGRAM(s) Oral before breakfast  polyethylene glycol 3350 17 Gram(s) Oral daily  senna 2 Tablet(s) Oral at bedtime  tiotropium 2.5 MICROgram(s) Inhaler 2 Puff(s) Inhalation daily    MEDICATIONS  (PRN):  acetaminophen     Tablet .. 650 milliGRAM(s) Oral every 6 hours PRN Temp greater or equal to 38C (100.4F), Mild Pain (1 - 3)  aluminum hydroxide/magnesium hydroxide/simethicone Suspension 30 milliLiter(s) Oral every 4 hours PRN Dyspepsia  melatonin 3 milliGRAM(s) Oral at bedtime PRN Insomnia        Vital Signs Last 24 Hrs  T(C): 36.6 (13 Jun 2025 05:16), Max: 37.1 (12 Jun 2025 12:33)  T(F): 97.9 (13 Jun 2025 05:16), Max: 98.7 (12 Jun 2025 12:33)  HR: 78 (13 Jun 2025 05:16) (78 - 98)  BP: 145/73 (13 Jun 2025 05:16) (123/68 - 145/73)  BP(mean): --  RR: 18 (13 Jun 2025 05:16) (18 - 18)  SpO2: 95% (13 Jun 2025 05:16) (89% - 95%)    Parameters below as of 13 Jun 2025 05:16  Patient On (Oxygen Delivery Method): room air        PHYSICAL EXAM:    ABDOMEN: Soft. No SP tenderness or distention    LABS:                        12.5   11.38 )-----------( 220      ( 12 Jun 2025 05:16 )             36.9     06-12    136  |  102  |  14  ----------------------------<  110[H]  4.3   |  28  |  0.90    Ca    8.9      12 Jun 2025 05:16  Phos  3.4     06-12  Mg     2.2     06-12    TPro  7.2  /  Alb  3.6  /  TBili  0.9  /  DBili  x   /  AST  21  /  ALT  17  /  AlkPhos  104  06-12    PT/INR - ( 11 Jun 2025 14:37 )   PT: 12.7 sec;   INR: 1.08 ratio         PTT - ( 11 Jun 2025 14:37 )  PTT:32.0 sec  Urinalysis Basic - ( 12 Jun 2025 05:16 )    Color: x / Appearance: x / SG: x / pH: x  Gluc: 110 mg/dL / Ketone: x  / Bili: x / Urobili: x   Blood: x / Protein: x / Nitrite: x   Leuk Esterase: x / RBC: x / WBC x   Sq Epi: x / Non Sq Epi: x / Bacteria: x      Urine culture:  06-11 @ 14:56 --   <10,000 CFU/mL Normal Urogenital Sherry

## 2025-06-13 NOTE — PROGRESS NOTE ADULT - PROBLEM SELECTOR PLAN 3
-chronic   -on home cpap machine, pt does not know his settings
-chronic   -on home cpap machine, pt does not know his settings

## 2025-06-13 NOTE — PROGRESS NOTE ADULT - PROBLEM SELECTOR PLAN 2
-Chronic  -continue home Lasix 20mg qd with hold parameters for systolic BP <110  -continue to monitor hemodynamics -Chronic for leg edema   -continue home Lasix 20mg qd with hold parameters for systolic BP <110  -continue to monitor hemodynamics

## 2025-06-13 NOTE — PROGRESS NOTE ADULT - ASSESSMENT
84 yr old M with PMH  HTN and BPH presents to ED for hematuria and urinary retention. Pt admitted for hematuria. 
84 yr old M with PMH  HTN and BPH presents to ED for hematuria and urinary retention. Pt admitted for hematuria.

## 2025-06-13 NOTE — DISCHARGE NOTE NURSING/CASE MANAGEMENT/SOCIAL WORK - PATIENT PORTAL LINK FT
You can access the FollowMyHealth Patient Portal offered by [facility] by registering at the following website: [email or web address]. By joining [facility]’s FollowMyHealth portal, you will also be able to view your health information using other applications (apps) compatible with our system.

## 2025-06-13 NOTE — PROGRESS NOTE ADULT - SUBJECTIVE AND OBJECTIVE BOX
Patient is a 84y old  Male who presents with a chief complaint of hematuria (13 Jun 2025 06:15)    HPI:  84 yr old M with PMH  HTN, COPD (on home 2L NC), BPH ,s/p TURP  AMY on CPAP, and gout presents to ED for hematuria and urinary retention. Pt reports since yesterday he has been trickling urine with blood and passing clots. Pt states he had retention 2 days ago and felt like he wasn't emptying out fully. Pt takes ASA 81mg daily and no other AC. Pt had TURP in 2022 and last saw urology in 2023. Pt reports severe constipation for the past 3 days. pt was straining to have BM ,CBI started in ER, Pt was in urinary Retention ,Wheeler bag currently with bright red urine.   Denies fevers, chills, HA, chest pain, SOB, abdominal pain, n/v.     ED course:  Vitals: BP: 116/69 , HR: 80 , Temp: 97.8 , RR: 20 , SpO2: 99% on RA   Labs significant for: Na 134. CBC wnl, rest of CMP wnl   UA: red, WBC 35, RBC too numerous, few bacteria   EKG: pending   In ED given: Rocephin x1, Ceftin x1, NS 500mL x1    Imaging  CXR: Clear lungs with no acute cardiopulmonary abnormalities.  CT a/p: Soft tissue density filling the bladder, unclear if hematoma or tumor.   (12 Jun 2025 01:16)    INTERVAL HPI:  6/12: Pt seen and examined at bedside. Pt endorses mild pain, only when he urinates. Has not had BM. On nocturnal CPAP, unclear settings. +wheeler in place  S/P CBI stopped by ariela  6/13: Pt seen and examined at bedside. Pt feels well and is eager to go home. Wheeler removed this AM, has since urinated. Wants to walk around.     OVERNIGHT EVENTS: None    Home Medications:  Albuterol (Eqv-ProAir HFA) 90 mcg/inh inhalation aerosol: 2 puff(s) inhaled 2 times a day (12 Jun 2025 04:25)  allopurinol 300 mg oral tablet: 1 tab(s) orally once a day (12 Jun 2025 04:13)  aspirin 81 mg oral tablet: 1 tab(s) orally once a day (12 Jun 2025 04:13)  furosemide 20 mg oral tablet: 1 tab(s) orally once a day (12 Jun 2025 04:13)  ipratropium-albuterol 0.5 mg-2.5 mg/3 mL inhalation solution: 2 puff(s) by nebulizer 2 times a day (12 Jun 2025 04:25)  polyethylene glycol 3350 oral powder for reconstitution: 17 gram(s) orally once a day (12 Jun 2025 17:04)  senna leaf extract oral tablet: 2 tab(s) orally once a day (at bedtime) (12 Jun 2025 17:04)      MEDICATIONS  (STANDING):  albuterol    90 MICROgram(s) HFA Inhaler 2 Puff(s) Inhalation two times a day  allopurinol 300 milliGRAM(s) Oral daily  furosemide    Tablet 20 milliGRAM(s) Oral daily  magnesium hydroxide Suspension 30 milliLiter(s) Oral daily  pantoprazole    Tablet 40 milliGRAM(s) Oral before breakfast  polyethylene glycol 3350 17 Gram(s) Oral daily  senna 2 Tablet(s) Oral at bedtime  tiotropium 2.5 MICROgram(s) Inhaler 2 Puff(s) Inhalation daily    MEDICATIONS  (PRN):  acetaminophen     Tablet .. 650 milliGRAM(s) Oral every 6 hours PRN Temp greater or equal to 38C (100.4F), Mild Pain (1 - 3)  aluminum hydroxide/magnesium hydroxide/simethicone Suspension 30 milliLiter(s) Oral every 4 hours PRN Dyspepsia  melatonin 3 milliGRAM(s) Oral at bedtime PRN Insomnia      Allergies    No Known Allergies    Intolerances        Social History:  Tobacco: denies  EtOH: denies  Recreational drug use: denies  Lives with: patient lives alone at home  Ambulates: independent  ADLs: independent (12 Jun 2025 01:16)      REVIEW OF SYSTEMS:  CONSTITUTIONAL: No fever, No chills, No fatigue, No myalgia, No Body ache, No Weakness  EYES: No eye pain,  No visual disturbances, No discharge, NO Redness  ENMT:  No ear pain, No nose bleed, No vertigo; No sinus pain, NO throat pain, No Congestion  NECK: No pain, No stiffness  RESPIRATORY: No cough, NO wheezing, No  hemoptysis, NO  shortness of breath  CARDIOVASCULAR: No chest pain, palpitations  GASTROINTESTINAL: No abdominal pain, NO epigastric pain. No nausea, No vomiting; No diarrhea, No constipation. [ X ] BM  GENITOURINARY: No dysuria, No frequency, No urgency, No hematuria, NO incontinence  NEUROLOGICAL: No headaches, No dizziness, No numbness, No tingling, No tremors, No weakness  EXT: No Swelling, No Pain, No Edema  SKIN:  [  ] No itching, burning, rashes, or lesions   MUSCULOSKELETAL: No joint pain ,No Jt swelling; No muscle pain, No back pain, No extremity pain  PSYCHIATRIC: No depression,  No anxiety,  No mood swings ,No difficulty sleeping at night  PAIN SCALE: [ X ] None  [  ] Other-  ROS Unable to obtain due to - [  ] Dementia  [  ] Lethargy [  ] Drowsy [  ] Sedated [  ] non verbal  REST OF REVIEW Of SYSTEM - [ X ] Normal     Vital Signs Last 24 Hrs  T(C): 36.6 (13 Jun 2025 05:16), Max: 37.1 (12 Jun 2025 12:33)  T(F): 97.9 (13 Jun 2025 05:16), Max: 98.7 (12 Jun 2025 12:33)  HR: 78 (13 Jun 2025 05:16) (78 - 98)  BP: 145/73 (13 Jun 2025 05:16) (123/68 - 145/73)  BP(mean): --  RR: 18 (13 Jun 2025 05:16) (18 - 18)  SpO2: 95% (13 Jun 2025 05:16) (89% - 95%)    Parameters below as of 13 Jun 2025 05:16  Patient On (Oxygen Delivery Method): room air      Finger Stick        06-12 @ 07:01  -  06-13 @ 07:00  --------------------------------------------------------  IN: 0 mL / OUT: 600 mL / NET: -600 mL        PHYSICAL EXAM:  GENERAL:  [ X ] NAD , [ X ] well appearing, [  ] Agitated, [  ] Drowsy,  [  ] Lethargy, [  ] confused   HEAD:  [ X ] Normal, [  ] Other  EYES:  [ X ] EOMI, [ X ] PERRLA, [ X ] conjunctiva and sclera clear normal, [  ] Other,  [  ] Pallor,[  ] Discharge  ENMT:  [ X ] Normal, [ X ] Moist mucous membranes, [ X ] Good dentition, [ X ] No Thrush  NECK: [ X ] Supple, [ X ] No JVD, [ X ] Normal thyroid, [  ] Lymphadenopathy [  ] Other  CHEST/LUNG:  [ X ] Clear to auscultation bilaterally, [ X ] Breath Sounds equal B/L / Decrease, [  ] poor effort  [ X ] No rales, [ X ] No rhonchi  [ X ]  No wheezing,   HEART:  [ X ] Regular rate and rhythm, [  ] tachycardia, [  ] Bradycardia,  [  ] irregular  [ X ] No murmurs, No rubs, No gallops, [  ] PPM in place (Mfr:  )  ABDOMEN:  [  ] Soft, [ X ] Nontender, [ X ] Nondistended, [ X ] No mass, [ X ] Bowel sounds present, [  ] obese  NERVOUS SYSTEM:  [ X ] Alert & Oriented X3, [ X ] Nonfocal  [  ] Confusion  [  ] Encephalopathic [  ] Sedated [  ] Unable to assess, [  ] Dementia [  ] Other-  EXTREMITIES: [ X ] 2+ Peripheral Pulses, No clubbing, No cyanosis,  [ X ] NO edema B/L lower EXT. [  ] PVD stasis skin changes B/L Lower EXT, [  ] wound  LYMPH: No lymphadenopathy noted  SKIN:  [ X ] No rashes or lesions, [  ] Pressure Ulcers, [  ] ecchymosis, [  ] Skin Tears, [  ] Other    DIET: Diet, Regular:   Lacto Veg (Accepts Milk & Milk Products) (06-12-25 @ 02:06)      LABS:                        12.4   8.44  )-----------( 221      ( 13 Jun 2025 07:55 )             36.8     13 Jun 2025 07:55    137    |  99     |  14     ----------------------------<  113    3.9     |  30     |  0.94     Ca    8.8        13 Jun 2025 07:55      PT/INR - ( 11 Jun 2025 14:37 )   PT: 12.7 sec;   INR: 1.08 ratio         PTT - ( 11 Jun 2025 14:37 )  PTT:32.0 sec  Urinalysis Basic - ( 13 Jun 2025 07:55 )    Color: x / Appearance: x / SG: x / pH: x  Gluc: 113 mg/dL / Ketone: x  / Bili: x / Urobili: x   Blood: x / Protein: x / Nitrite: x   Leuk Esterase: x / RBC: x / WBC x   Sq Epi: x / Non Sq Epi: x / Bacteria: x        Culture Results:   <10,000 CFU/mL Normal Urogenital Sherry (06-11 @ 14:56)      culture blood  -- Clean Catch Clean Catch (Midstream) 06-11 @ 14:56    culture urine  --  06-11 @ 14:56              Culture - Urine (collected 11 Jun 2025 14:56)  Source: Clean Catch Clean Catch (Midstream)  Final Report (12 Jun 2025 23:34):    <10,000 CFU/mL Normal Urogenital Sherry           HEALTH ISSUES - PROBLEM Dx:  Hematuria    HTN (hypertension)    BPH (benign prostatic hyperplasia)    Need for prophylactic measure    History of COPD    AMY (obstructive sleep apnea)    Gout            Consultant(s) Notes Reviewed:  [ X ] YES     Care Discussed with [X] Consultants  [ X ] Patient  [  ] Family [  ] HCP [  ]   [  ] Social Service  [  ] RN, [  ] Physical Therapy,[  ] Palliative care team  DVT PPX: [  ] Lovenox, [  ] S C Heparin, [  ] Coumadin, [  ] Xarelto, [  ] Eliquis, [  ] Pradaxa, [  ] IV Heparin drip, [  ] SCD [  ] Contraindication 2 to GI Bleed,[  ] Ambulation [  ] Contraindicated 2 to  bleed [  ] Contraindicated 2 to Brain Bleed  Advanced directive: [ X ] None, [  ] DNR/DNI Patient is a 84y old  Male who presents with a chief complaint of hematuria (13 Jun 2025 06:15)    HPI:  84 yr old M with PMH  HTN, COPD (on home 2L NC), BPH ,s/p TURP  AMY on CPAP, and gout presents to ED for hematuria and urinary retention. Pt reports since yesterday he has been trickling urine with blood and passing clots. Pt states he had retention 2 days ago and felt like he wasn't emptying out fully. Pt takes ASA 81mg daily and no other AC. Pt had TURP in 2022 and last saw urology in 2023. Pt reports severe constipation for the past 3 days. pt was straining to have BM ,CBI started in ER, Pt was in urinary Retention ,Wheeler bag currently with bright red urine.   Denies fevers, chills, HA, chest pain, SOB, abdominal pain, n/v.     ED course:  Vitals: BP: 116/69 , HR: 80 , Temp: 97.8 , RR: 20 , SpO2: 99% on RA   Labs significant for: Na 134. CBC wnl, rest of CMP wnl   UA: red, WBC 35, RBC too numerous, few bacteria   EKG: pending   In ED given: Rocephin x1, Ceftin x1, NS 500mL x1    Imaging  CXR: Clear lungs with no acute cardiopulmonary abnormalities.  CT a/p: Soft tissue density filling the bladder, unclear if hematoma or tumor.   (12 Jun 2025 01:16)    INTERVAL HPI:  6/12: Pt seen and examined at bedside. Pt endorses mild pain, only when he urinates. Has not had BM. On nocturnal CPAP, unclear settings. +wheeler in place  S/P CBI stopped by ariela  6/13: Pt seen and examined at bedside. Pt feels well and is eager to go home. Wheeler removed this AM, has since urinated. Wants to walk around.     OVERNIGHT EVENTS: None    Home Medications:  Albuterol (Eqv-ProAir HFA) 90 mcg/inh inhalation aerosol: 2 puff(s) inhaled 2 times a day (12 Jun 2025 04:25)  allopurinol 300 mg oral tablet: 1 tab(s) orally once a day (12 Jun 2025 04:13)  aspirin 81 mg oral tablet: 1 tab(s) orally once a day (12 Jun 2025 04:13)  furosemide 20 mg oral tablet: 1 tab(s) orally once a day (12 Jun 2025 04:13)  ipratropium-albuterol 0.5 mg-2.5 mg/3 mL inhalation solution: 2 puff(s) by nebulizer 2 times a day (12 Jun 2025 04:25)  polyethylene glycol 3350 oral powder for reconstitution: 17 gram(s) orally once a day (12 Jun 2025 17:04)  senna leaf extract oral tablet: 2 tab(s) orally once a day (at bedtime) (12 Jun 2025 17:04)      MEDICATIONS  (STANDING):  albuterol    90 MICROgram(s) HFA Inhaler 2 Puff(s) Inhalation two times a day  allopurinol 300 milliGRAM(s) Oral daily  furosemide    Tablet 20 milliGRAM(s) Oral daily  magnesium hydroxide Suspension 30 milliLiter(s) Oral daily  pantoprazole    Tablet 40 milliGRAM(s) Oral before breakfast  polyethylene glycol 3350 17 Gram(s) Oral daily  senna 2 Tablet(s) Oral at bedtime  tiotropium 2.5 MICROgram(s) Inhaler 2 Puff(s) Inhalation daily    MEDICATIONS  (PRN):  acetaminophen     Tablet .. 650 milliGRAM(s) Oral every 6 hours PRN Temp greater or equal to 38C (100.4F), Mild Pain (1 - 3)  aluminum hydroxide/magnesium hydroxide/simethicone Suspension 30 milliLiter(s) Oral every 4 hours PRN Dyspepsia  melatonin 3 milliGRAM(s) Oral at bedtime PRN Insomnia      Allergies    No Known Allergies    Intolerances        Social History:  Tobacco: denies  EtOH: denies  Recreational drug use: denies  Lives with: patient lives alone at home  Ambulates: independent  ADLs: independent (12 Jun 2025 01:16)      REVIEW OF SYSTEMS: i am Voiding No BLOOD  CONSTITUTIONAL: No fever, No chills, No fatigue, No myalgia, No Body ache, No Weakness  EYES: No eye pain,  No visual disturbances, No discharge, NO Redness  ENMT:  No ear pain, No nose bleed, No vertigo; No sinus pain, NO throat pain, No Congestion  NECK: No pain, No stiffness  RESPIRATORY: No cough, NO wheezing, No  hemoptysis, NO  shortness of breath  CARDIOVASCULAR: No chest pain, palpitations  GASTROINTESTINAL: No abdominal pain, NO epigastric pain. No nausea, No vomiting; No diarrhea, No constipation. [ X ] BM many  GENITOURINARY: No dysuria, No frequency, No urgency, No hematuria, NO incontinence  NEUROLOGICAL: No headaches, No dizziness, No numbness, No tingling, No tremors, No weakness  EXT: No Swelling, No Pain, No Edema  SKIN:  [ x ] No itching, burning, rashes, or lesions   MUSCULOSKELETAL: No joint pain ,No Jt swelling; No muscle pain, No back pain, No extremity pain  PSYCHIATRIC: No depression,  No anxiety,  No mood swings ,No difficulty sleeping at night  PAIN SCALE: [ X ] None  [  ] Other-  ROS Unable to obtain due to - [  ] Dementia  [  ] Lethargy [  ] Drowsy [  ] Sedated [  ] non verbal  REST OF REVIEW Of SYSTEM - [ X ] Normal     Vital Signs Last 24 Hrs  T(C): 36.6 (13 Jun 2025 05:16), Max: 37.1 (12 Jun 2025 12:33)  T(F): 97.9 (13 Jun 2025 05:16), Max: 98.7 (12 Jun 2025 12:33)  HR: 78 (13 Jun 2025 05:16) (78 - 98)  BP: 145/73 (13 Jun 2025 05:16) (123/68 - 145/73)  BP(mean): --  RR: 18 (13 Jun 2025 05:16) (18 - 18)  SpO2: 95% (13 Jun 2025 05:16) (89% - 95%)    Parameters below as of 13 Jun 2025 05:16  Patient On (Oxygen Delivery Method): room air      Finger Stick        06-12 @ 07:01  -  06-13 @ 07:00  --------------------------------------------------------  IN: 0 mL / OUT: 600 mL / NET: -600 mL        PHYSICAL EXAM:  GENERAL:  [ X ] NAD , [ X ] well appearing, [  ] Agitated, [  ] Drowsy,  [  ] Lethargy, [  ] confused   HEAD:  [ X ] Normal, [  ] Other  EYES:  [ X ] EOMI, [ X ] PERRLA, [ X ] conjunctiva and sclera clear normal, [  ] Other,  [  ] Pallor,[  ] Discharge  ENMT:  [ X ] Normal, [ X ] Moist mucous membranes, [ X ] Good dentition, [ X ] No Thrush  NECK: [ X ] Supple, [ X ] No JVD, [ X ] Normal thyroid, [  ] Lymphadenopathy [  ] Other  CHEST/LUNG:  [ X ] Clear to auscultation bilaterally, [ X ] Breath Sounds equal B/L / Decrease, [  ] poor effort  [ X ] No rales, [ X ] No rhonchi  [ X ]  No wheezing,   HEART:  [ X ] Regular rate and rhythm, [  ] tachycardia, [  ] Bradycardia,  [  ] irregular  [ X ] No murmurs, No rubs, No gallops, [  ] PPM in place (Mfr:  )  ABDOMEN:  [  ] Soft, [ X ] Nontender, [ X ] Nondistended, [ X ] No mass, [ X ] Bowel sounds present, [  ] obese  NERVOUS SYSTEM:  [ X ] Alert & Oriented X3, [ X ] Nonfocal  [  ] Confusion  [  ] Encephalopathic [  ] Sedated [  ] Unable to assess, [  ] Dementia [  ] Other-  EXTREMITIES: [ X ] 2+ Peripheral Pulses, No clubbing, No cyanosis,  [ X ] NO edema B/L lower EXT. [  ] PVD stasis skin changes B/L Lower EXT, [  ] wound  LYMPH: No lymphadenopathy noted  SKIN:  [ X ] No rashes or lesions, [  ] Pressure Ulcers, [  ] ecchymosis, [  ] Skin Tears, [  ] Other    DIET: Diet, Regular:   Lacto Veg (Accepts Milk & Milk Products) (06-12-25 @ 02:06)      LABS:                        12.4   8.44  )-----------( 221      ( 13 Jun 2025 07:55 )             36.8     13 Jun 2025 07:55    137    |  99     |  14     ----------------------------<  113    3.9     |  30     |  0.94     Ca    8.8        13 Jun 2025 07:55      PT/INR - ( 11 Jun 2025 14:37 )   PT: 12.7 sec;   INR: 1.08 ratio         PTT - ( 11 Jun 2025 14:37 )  PTT:32.0 sec  Urinalysis Basic - ( 13 Jun 2025 07:55 )    Color: x / Appearance: x / SG: x / pH: x  Gluc: 113 mg/dL / Ketone: x  / Bili: x / Urobili: x   Blood: x / Protein: x / Nitrite: x   Leuk Esterase: x / RBC: x / WBC x   Sq Epi: x / Non Sq Epi: x / Bacteria: x        Culture Results:   <10,000 CFU/mL Normal Urogenital Sherry (06-11 @ 14:56)      culture blood  -- Clean Catch Clean Catch (Midstream) 06-11 @ 14:56    culture urine  --  06-11 @ 14:56              Culture - Urine (collected 11 Jun 2025 14:56)  Source: Clean Catch Clean Catch (Midstream)  Final Report (12 Jun 2025 23:34):    <10,000 CFU/mL Normal Urogenital Sherry           HEALTH ISSUES - PROBLEM Dx:  Hematuria    HTN (hypertension)    BPH (benign prostatic hyperplasia)    Need for prophylactic measure    History of COPD    AMY (obstructive sleep apnea)    Gout      Consultant(s) Notes Reviewed:  [ X ] YES     Care Discussed with [X] Consultants  [ X ] Patient  [x  ] Family [  ] HCP [  ]   [  ] Social Service  [ x ] RN, [  ] Physical Therapy,[  ] Palliative care team  DVT PPX: [  ] Lovenox, [  ] S C Heparin, [  ] Coumadin, [  ] Xarelto, [  ] Eliquis, [  ] Pradaxa, [  ] IV Heparin drip, [ x ] SCD [  ] Contraindication 2 to GI Bleed,[  ] Ambulation [x  ] Contraindicated 2 to  bleed [  ] Contraindicated 2 to Brain Bleed  Advanced directive: [ X ] None, [  ] DNR/DNI

## (undated) DEVICE — SYR IV POSIFLUSH NS 3ML 30/TY

## (undated) DEVICE — CATH IV SAFE BC 20G X 1.16" (PINK)

## (undated) DEVICE — CATH ELECHMSTAT  INJ 7FR 210CM

## (undated) DEVICE — TUBING IV SET GRAVITY 3Y 100" MACRO

## (undated) DEVICE — SYR LUER LOK 30CC

## (undated) DEVICE — SOL IRR POUR H2O 500ML

## (undated) DEVICE — FORCEP RADIAL JAW 4 W NDL 2.2MM 2.8MM 160CM YELLOW DISP

## (undated) DEVICE — TUBING SUCTION CONN 6FT STERILE

## (undated) DEVICE — STERIS DEFENDO 3-PIECE KIT (AIR/WATER, SUCTION & BIOPSY VALVES)

## (undated) DEVICE — GLV 8 PROTEXIS (WHITE)

## (undated) DEVICE — CATH ELCTR GLIDE PRB 7FR

## (undated) DEVICE — TUBING CANNULA SALTER LABS NASAL ADULT 7FT

## (undated) DEVICE — SOL IRR NS 0.9% 250ML

## (undated) DEVICE — SOL IRR BAG H2O 1000ML

## (undated) DEVICE — SNARE LRG

## (undated) DEVICE — SOL IRR POUR H2O 1000ML

## (undated) DEVICE — TUBE O2 SUPL CRUSH RESIS CONN SOUTHSIDE ONLY

## (undated) DEVICE — BRUSH CYTO ENDO

## (undated) DEVICE — SENSOR O2 FINGER XL ADULT 24/BX 6BX/CA

## (undated) DEVICE — NDL INJ SCLERO INTERJECT 23G

## (undated) DEVICE — SUCTION YANKAUER TAPERED BULBOUS NO VENT

## (undated) DEVICE — CATH IV SAFE BC 22G X 1" (BLUE)

## (undated) DEVICE — FORMALIN CUPS 10% BUFFERED

## (undated) DEVICE — SOL IRR POUR NS 0.9% 1000ML

## (undated) DEVICE — ELCTR GROUNDING PAD ADULT COVIDIEN

## (undated) DEVICE — Device

## (undated) DEVICE — SET IV PUMP BLOOD 1VALVE 180FILTER NON-DEHP

## (undated) DEVICE — BITE BLOCK MAXI RUBBER STAMP

## (undated) DEVICE — TUBING IV SET SECOND 34" W/O LOK-BLUNT

## (undated) DEVICE — MARKER ENDO SPOT EX

## (undated) DEVICE — BRUSH COLONOSCOPY CYTOLOGY

## (undated) DEVICE — DRSG CURITY GAUZE SPONGE 4 X 4" 12-PLY